# Patient Record
Sex: MALE | Race: WHITE
[De-identification: names, ages, dates, MRNs, and addresses within clinical notes are randomized per-mention and may not be internally consistent; named-entity substitution may affect disease eponyms.]

---

## 2018-02-19 ENCOUNTER — HOSPITAL ENCOUNTER (EMERGENCY)
Dept: HOSPITAL 61 - ER | Age: 23
Discharge: HOME | End: 2018-02-19
Payer: SELF-PAY

## 2018-02-19 DIAGNOSIS — H66.92: ICD-10-CM

## 2018-02-19 DIAGNOSIS — I10: ICD-10-CM

## 2018-02-19 DIAGNOSIS — Z88.0: ICD-10-CM

## 2018-02-19 DIAGNOSIS — K02.9: Primary | ICD-10-CM

## 2018-02-19 DIAGNOSIS — J45.909: ICD-10-CM

## 2018-02-19 PROCEDURE — 99284 EMERGENCY DEPT VISIT MOD MDM: CPT

## 2018-09-25 ENCOUNTER — HOSPITAL ENCOUNTER (EMERGENCY)
Dept: HOSPITAL 61 - ER | Age: 23
Discharge: HOME | End: 2018-09-25
Payer: SELF-PAY

## 2018-09-25 VITALS — DIASTOLIC BLOOD PRESSURE: 88 MMHG | SYSTOLIC BLOOD PRESSURE: 170 MMHG

## 2018-09-25 VITALS — BODY MASS INDEX: 47.74 KG/M2 | HEIGHT: 68 IN | WEIGHT: 315 LBS

## 2018-09-25 DIAGNOSIS — H92.02: Primary | ICD-10-CM

## 2018-09-25 DIAGNOSIS — I10: ICD-10-CM

## 2018-09-25 DIAGNOSIS — Z90.89: ICD-10-CM

## 2018-09-25 DIAGNOSIS — K02.9: ICD-10-CM

## 2018-09-25 DIAGNOSIS — J45.909: ICD-10-CM

## 2018-09-25 DIAGNOSIS — Z88.0: ICD-10-CM

## 2018-09-25 PROCEDURE — 99283 EMERGENCY DEPT VISIT LOW MDM: CPT

## 2018-09-25 RX ADMIN — IBUPROFEN ONE MG: 800 TABLET ORAL at 07:30

## 2018-09-25 RX ADMIN — CLINDAMYCIN HYDROCHLORIDE ONE MG: 150 CAPSULE ORAL at 07:48

## 2018-09-25 NOTE — PHYS DOC
Past Medical History


Past Medical History:  Asthma, Hypertension


Past Surgical History:  Tonsillectomy, Other


Additional Past Surgical Histo:  wisdom teeth


Alcohol Use:  None


Drug Use:  None





Adult General


Chief Complaint


Chief Complaint:  EARACHE/EAR PAIN





HPI


HPI





Patient is a 23  year old male who presents with left ear pain.  She complains 

of pain in the left ear which started over the last 24 hours. He's had no fever 

or chills. He does relate a prior history of multiple ear infections since he 

was a child. No neck pain or stiffness. No headaches. Patient is known to have 

multiple dental caries on the left side and does have scheduled appointment 

with a dentist later today.





Review of Systems


Review of Systems





Constitutional: Denies fever or chills 


Eyes: Denies change in visual acuity


HENT: Denies nasal congestion or sore throat 


Respiratory: Denies cough or shortness of breath 


Cardiovascular: No additional information not addressed in HPI 


Musculoskeletal: Denies back pain or joint pain 


Integument: Denies rash or skin lesions 


Neurologic: Denies headache





All other systems were reviewed and found to be within normal limits, except as 

documented in this note.





Allergies


Allergies





Allergies








Coded Allergies Type Severity Reaction Last Updated Verified


 


  Penicillins Allergy Intermediate  8/22/14 No











Physical Exam


Physical Exam





Constitutional: Well developed, well nourished, no acute distress, non-toxic 

appearance


HENT: Normocephalic, atraumatic, bilateral external ears normal, oropharynx 

moist, no oral exudates, nose normal, TM on left is clear, grey, with + light 

reflex, multiple dental caries and fractures are present over the left upper 

and lower molar area.  No abscess or fluctuance is seen.  Floor of mouth is 

soft.  Posterior oral pharynx is clear


Eyes: PERRLA, EOMI, conjunctiva normal, no discharge 


Neck: Normal range of motion, no tenderness, supple, no stridor 


Cardiovascular:Heart rate regular rhythm, no murmur 


Lungs & Thorax:  Bilateral breath sounds clear to auscultation  


Skin: Warm, dry  


Extremities: No tenderness 


Neurologic: Alert and oriented X 3


Psychologic: Affect normal





EKG


EKG


[]





Radiology/Procedures


Radiology/Procedures


[]





Course & Med Decision Making


Course & Med Decision Making


Pertinent Labs and Imaging studies reviewed. (See chart for details)





Patient is seen for left ear pain.  No acute ear infection is present on exam 

but the patient describes pain along the SCM muscle and left jaw.  Poor 

dentition.  Source of the patient's discomfort seems most likely from a dental 

source.  He is given ibuprofen and clindamycin in the ER (PCN allergic).  

Discharged home with Rx for the same.  Advised to keep his already scheduled 

appointment later today.  F/u with PCP also or return to the ER for any new or 

worsening symptoms.





Dragon Disclaimer


Dragon Disclaimer


This electronic medical record was generated, in whole or in part, using a 

voice recognition dictation system.





Departure


Departure


Referrals:  


NO PCP (PCP)


Scripts


Clindamycin Hcl (CLINDAMYCIN HCL) 300 Mg Capsule


300 MG PO TID for 7 Days, #21 CAP


   Prov: FREDA GARSIA DO         9/25/18 


Ibuprofen (IBUPROFEN) 800 Mg Tablet


800 MG PO PRN TID PRN for PAIN, #21 TAB


   take with food or milk to avoid upsetting stomach


   Prov: FREDA GARSIA DO         9/25/18











FREDA GARSIA DO Sep 25, 2018 06:54

## 2021-03-12 ENCOUNTER — HOSPITAL ENCOUNTER (EMERGENCY)
Dept: HOSPITAL 61 - ER | Age: 26
Discharge: HOME | End: 2021-03-12
Payer: SELF-PAY

## 2021-03-12 VITALS — BODY MASS INDEX: 45.1 KG/M2 | WEIGHT: 315 LBS | HEIGHT: 70 IN

## 2021-03-12 VITALS — SYSTOLIC BLOOD PRESSURE: 153 MMHG | DIASTOLIC BLOOD PRESSURE: 70 MMHG

## 2021-03-12 DIAGNOSIS — Z88.0: ICD-10-CM

## 2021-03-12 DIAGNOSIS — L50.9: Primary | ICD-10-CM

## 2021-03-12 DIAGNOSIS — J45.909: ICD-10-CM

## 2021-03-12 DIAGNOSIS — E11.9: ICD-10-CM

## 2021-03-12 DIAGNOSIS — L03.818: ICD-10-CM

## 2021-03-12 DIAGNOSIS — I10: ICD-10-CM

## 2021-03-12 LAB
ALBUMIN SERPL-MCNC: 3.1 G/DL (ref 3.4–5)
ALBUMIN/GLOB SERPL: 0.9 {RATIO} (ref 1–1.7)
ALP SERPL-CCNC: 69 U/L (ref 46–116)
ALT SERPL-CCNC: 55 U/L (ref 16–63)
AMPHETAMINE/METHAMPHETAMINE: (no result)
ANION GAP SERPL CALC-SCNC: 10 MMOL/L (ref 6–14)
APTT PPP: (no result) S
AST SERPL-CCNC: 38 U/L (ref 15–37)
BACTERIA #/AREA URNS HPF: 0 /HPF
BARBITURATES UR-MCNC: (no result) UG/ML
BASE EXCESS STD BLDV CALC-SCNC: -1 MMOL/L (ref 0–3)
BASOPHILS # BLD AUTO: 0 X10^3/UL (ref 0–0.2)
BASOPHILS NFR BLD: 1 % (ref 0–3)
BENZODIAZ UR-MCNC: (no result) UG/L
BILIRUB SERPL-MCNC: 1.2 MG/DL (ref 0.2–1)
BILIRUB UR QL STRIP: (no result)
BUN SERPL-MCNC: 10 MG/DL (ref 8–26)
BUN/CREAT SERPL: 10 (ref 6–20)
CALCIUM SERPL-MCNC: 8.3 MG/DL (ref 8.5–10.1)
CANNABINOIDS UR-MCNC: (no result) UG/L
CHLORIDE SERPL-SCNC: 102 MMOL/L (ref 98–107)
CO2 BLDV-SCNC: 26 MMOL/L (ref 21–32)
CO2 SERPL-SCNC: 25 MMOL/L (ref 21–32)
COCAINE UR-MCNC: (no result) NG/ML
CREAT SERPL-MCNC: 1 MG/DL (ref 0.7–1.3)
EOSINOPHIL NFR BLD: 0.6 X10^3/UL (ref 0–0.7)
EOSINOPHIL NFR BLD: 8 % (ref 0–3)
ERYTHROCYTE [DISTWIDTH] IN BLOOD BY AUTOMATED COUNT: 14.5 % (ref 11.5–14.5)
FIBRINOGEN PPP-MCNC: CLEAR MG/DL
GFR SERPLBLD BASED ON 1.73 SQ M-ARVRAT: 89.6 ML/MIN
GLUCOSE SERPL-MCNC: 121 MG/DL (ref 70–99)
GRAN CASTS #/AREA URNS LPF: (no result) /HPF
HCO3 BLDV-SCNC: 24 MMOL/L (ref 24–28)
HCT VFR BLD CALC: 45.8 % (ref 39–53)
HGB BLD-MCNC: 15.5 G/DL (ref 13–17.5)
HYALINE CASTS #/AREA URNS LPF: (no result) /HPF
INSPIRATION SETTING TIME VENT: 21
LYMPHOCYTES # BLD: 0.8 X10^3/UL (ref 1–4.8)
LYMPHOCYTES NFR BLD AUTO: 9 % (ref 24–48)
MCH RBC QN AUTO: 29 PG (ref 25–35)
MCHC RBC AUTO-ENTMCNC: 34 G/DL (ref 31–37)
MCV RBC AUTO: 85 FL (ref 79–100)
METHADONE SERPL-MCNC: (no result) NG/ML
MONO #: 0.6 X10^3/UL (ref 0–1.1)
MONOCYTES NFR BLD: 7 % (ref 0–9)
NEUT #: 6.3 X10^3/UL (ref 1.8–7.7)
NEUTROPHILS NFR BLD AUTO: 75 % (ref 31–73)
NITRITE UR QL STRIP: NEGATIVE
OPIATES UR-MCNC: (no result) NG/ML
PCO2 BLDV: 44 MMHG (ref 41–51)
PCP SERPL-MCNC: (no result) MG/DL
PH BLDV: 7.35 [PH] (ref 7.32–7.42)
PH UR STRIP: 5.5 [PH]
PLATELET # BLD AUTO: 240 X10^3/UL (ref 140–400)
PO2 BLDV: 64 MMHG (ref 20–40)
POTASSIUM SERPL-SCNC: 4 MMOL/L (ref 3.5–5.1)
PROT SERPL-MCNC: 6.7 G/DL (ref 6.4–8.2)
PROT UR STRIP-MCNC: 30 MG/DL
RBC # BLD AUTO: 5.37 X10^6/UL (ref 4.3–5.7)
RBC #/AREA URNS HPF: 0 /HPF (ref 0–2)
SAO2 % BLDV FROM PO2: 91 %
SODIUM SERPL-SCNC: 137 MMOL/L (ref 136–145)
UROBILINOGEN UR-MCNC: 0.2 MG/DL
WBC # BLD AUTO: 8.3 X10^3/UL (ref 4–11)
WBC #/AREA URNS HPF: (no result) /HPF (ref 0–4)

## 2021-03-12 PROCEDURE — 96361 HYDRATE IV INFUSION ADD-ON: CPT

## 2021-03-12 PROCEDURE — 87086 URINE CULTURE/COLONY COUNT: CPT

## 2021-03-12 PROCEDURE — 80053 COMPREHEN METABOLIC PANEL: CPT

## 2021-03-12 PROCEDURE — 87040 BLOOD CULTURE FOR BACTERIA: CPT

## 2021-03-12 PROCEDURE — 96374 THER/PROPH/DIAG INJ IV PUSH: CPT

## 2021-03-12 PROCEDURE — 83880 ASSAY OF NATRIURETIC PEPTIDE: CPT

## 2021-03-12 PROCEDURE — 80307 DRUG TEST PRSMV CHEM ANLYZR: CPT

## 2021-03-12 PROCEDURE — 83605 ASSAY OF LACTIC ACID: CPT

## 2021-03-12 PROCEDURE — 84484 ASSAY OF TROPONIN QUANT: CPT

## 2021-03-12 PROCEDURE — 96375 TX/PRO/DX INJ NEW DRUG ADDON: CPT

## 2021-03-12 PROCEDURE — 81001 URINALYSIS AUTO W/SCOPE: CPT

## 2021-03-12 PROCEDURE — 82803 BLOOD GASES ANY COMBINATION: CPT

## 2021-03-12 PROCEDURE — 36415 COLL VENOUS BLD VENIPUNCTURE: CPT

## 2021-03-12 PROCEDURE — 93005 ELECTROCARDIOGRAM TRACING: CPT

## 2021-03-12 PROCEDURE — 99285 EMERGENCY DEPT VISIT HI MDM: CPT

## 2021-03-12 PROCEDURE — 85025 COMPLETE CBC W/AUTO DIFF WBC: CPT

## 2021-03-12 RX ADMIN — CLINDAMYCIN HYDROCHLORIDE ONE MG: 150 CAPSULE ORAL at 05:33

## 2021-03-12 RX ADMIN — BACITRACIN ONE MLS/HR: 5000 INJECTION, POWDER, FOR SOLUTION INTRAMUSCULAR at 04:06

## 2021-03-12 RX ADMIN — METHYLPREDNISOLONE SODIUM SUCCINATE ONE MG: 125 INJECTION, POWDER, FOR SOLUTION INTRAMUSCULAR; INTRAVENOUS at 05:33

## 2021-03-12 NOTE — ED.ADGEN
Past Medical History


Past Medical History:  Asthma, Diabetes-Type II, Hypertension


Past Surgical History:  Tonsillectomy, Other


Additional Past Surgical Histo:  wisdom teeth


Smoking Status:  Never Smoker


Alcohol Use:  Rarely


Drug Use:  None





General Adult


EDM:


Chief Complaint:  SKIN RASH/ABSCESS





HPI:


HPI:


25-year-old male coming in for diffuse upper body skin rash.  Patient states the

rash started off on his right arm and then his left arm.  None stated it went 

across his trunk.  Says the rash is itchy and he has been scratching at it.  Has

been using over-the-counter creams without improvement.  Denies any fevers, has 

had a nonproductive cough.  No vomiting or diarrhea.  Patient states he has a 

history of asthma and possibly diabetes.  Denies any history of IV drug use.  No

denies any sick contacts.





Review of Systems:


Review of Systems:


All other systems within normal limits except for as noted in the HPI





Current Medications:





Current Medications








 Medications


  (Trade)  Dose


 Ordered  Sig/Laisha  Start Time


 Stop Time Status Last Admin


Dose Admin


 


 Diphenhydramine


 HCl


  (Benadryl)  50 mg  1X  ONCE  3/12/21 04:00


 3/12/21 04:01 DC 3/12/21 04:09


50 MG


 


 Sodium Chloride  500 ml @ 


 500 mls/hr  1X  ONCE  3/12/21 04:00


 3/12/21 04:59 DC 3/12/21 04:06


500 MLS/HR











Allergies:


Allergies:





Allergies








Coded Allergies Type Severity Reaction Last Updated Verified


 


  Penicillins Allergy Intermediate  8/22/14 No











Physical Exam:


PE:


Constitutional: Well developed, well nourished, obese, ill-appearing []


HENT: Normocephalic, atraumatic, bilateral external ears normal,  nose normal. 

[]


Eyes: PERRLA, conjunctiva normal, no discharge. [] 


Neck: No rigidity, supple, no stridor. [] 


Cardiovascular: Tachycardic, regular rhythm, brisk cap refill []


Lungs & Thorax: Symmetric chest expansion, tachypneic []


Abdomen: Soft, nondistended.


Skin: Warm, diffuse macular rash with excoriated areas] 


Back: Unremarkable


Extremities: No deformities, range of motion grossly intact, no lower extremity 

edema [] 


Neurologic: Alert and oriented X 3, no focal deficits noted. []


Psychologic: Affect normal, judgement normal, mood normal. []





Current Patient Data:


Labs:





                                Laboratory Tests








Test


 3/12/21


04:00 3/12/21


04:24


 


White Blood Count


 8.3 x10^3/uL


(4.0-11.0) 





 


Red Blood Count


 5.37 x10^6/uL


(4.30-5.70) 





 


Hemoglobin


 15.5 g/dL


(13.0-17.5) 





 


Hematocrit


 45.8 %


(39.0-53.0) 





 


Mean Corpuscular Volume


 85 fL ()


 





 


Mean Corpuscular Hemoglobin 29 pg (25-35)   


 


Mean Corpuscular Hemoglobin


Concent 34 g/dL


(31-37) 





 


Red Cell Distribution Width


 14.5 %


(11.5-14.5) 





 


Platelet Count


 240 x10^3/uL


(140-400) 





 


Neutrophils (%) (Auto) 75 % (31-73)  H 


 


Lymphocytes (%) (Auto) 9 % (24-48)  L 


 


Monocytes (%) (Auto) 7 % (0-9)   


 


Eosinophils (%) (Auto) 8 % (0-3)  H 


 


Basophils (%) (Auto) 1 % (0-3)   


 


Neutrophils # (Auto)


 6.3 x10^3/uL


(1.8-7.7) 





 


Lymphocytes # (Auto)


 0.8 x10^3/uL


(1.0-4.8)  L 





 


Monocytes # (Auto)


 0.6 x10^3/uL


(0.0-1.1) 





 


Eosinophils # (Auto)


 0.6 x10^3/uL


(0.0-0.7) 





 


Basophils # (Auto)


 0.0 x10^3/uL


(0.0-0.2) 





 


Sodium Level


 137 mmol/L


(136-145) 





 


Potassium Level


 4.0 mmol/L


(3.5-5.1) 





 


Chloride Level


 102 mmol/L


() 





 


Carbon Dioxide Level


 25 mmol/L


(21-32) 





 


Anion Gap 10 (6-14)   


 


Blood Urea Nitrogen


 10 mg/dL


(8-26) 





 


Creatinine


 1.0 mg/dL


(0.7-1.3) 





 


Estimated GFR


(Cockcroft-Gault) 89.6  


 





 


BUN/Creatinine Ratio 10 (6-20)   


 


Glucose Level


 121 mg/dL


(70-99)  H 





 


Lactic Acid Level


 1.5 mmol/L


(0.4-2.0) 





 


Calcium Level


 8.3 mg/dL


(8.5-10.1)  L 





 


Total Bilirubin


 1.2 mg/dL


(0.2-1.0)  H 





 


Aspartate Amino Transferase


(AST) 38 U/L (15-37)


H 





 


Alanine Aminotransferase (ALT)


 55 U/L (16-63)


 





 


Alkaline Phosphatase


 69 U/L


() 





 


Troponin I Quantitative


 < 0.017 ng/mL


(0.000-0.055) 





 


NT-Pro-B-Type Natriuretic


Peptide 144 pg/mL


(0-124)  H 





 


Total Protein


 6.7 g/dL


(6.4-8.2) 





 


Albumin


 3.1 g/dL


(3.4-5.0)  L 





 


Albumin/Globulin Ratio


 0.9 (1.0-1.7)


L 





 


POC Venous pH


 


 7.35


(7.32-7.42)


 


POC Venous pCO2


 


 44 mmHg


(41-51)


 


POC Venous pO2


 


 64 mmHg


(20-40)  H


 


Venous Blood HCO3


 


 24 mmol/L


(24-28)


 


POC Venous O2 Saturation


(Anila) 


 91 %  





 


POC FiO2  21.0  





                                Laboratory Tests


3/12/21 04:00








                                Laboratory Tests


3/12/21 04:00








Vital Signs:





                                   Vital Signs








  Date Time  Temp Pulse Resp B/P (MAP) Pulse Ox O2 Delivery O2 Flow Rate FiO2


 


3/12/21 04:42  106  143/86 (105) 100 Room Air  


 


3/12/21 03:00 98.7  20     





 98.7       











EKG:


EKG:


Sinus tachycardia, heart enters the beats per minute, normal axis, no ST 

elevation or depression, no ectopy.  Normal intervals.  []





Heart Score:


C/O Chest Pain:  N/A


Risk Factors:


Risk Factors:  DM, Current or recent (<one month) smoker, HTN, HLP, family 

history of CAD, obesity.


Risk Scores:


Score 0 - 3:  2.5% MACE over next 6 weeks - Discharge Home


Score 4 - 6:  20.3% MACE over next 6 weeks - Admit for Clinical Observation


Score 7 - 10:  72.7% MACE over next 6 weeks - Early Invasive Strategies





Radiology/Procedures:


Radiology/Procedures:


[]





Course & Med Decision Making:


Course & Med Decision Making


Symptoms improved and labs unremarkable.  Patient feeling better after IV 

Benadryl.  Will give Solu-Medrol and first dose of clindamycin here.  Patient 

has a reaction of hives to penicillins.  Discussed return precautions if not 

getting better.





Dragon Disclaimer:


Dragon Disclaimer:


This electronic medical record was generated, in whole or in part, using a voice

 recognition dictation system.





Departure


Departure


Impression:  


   Primary Impression:  


   Hives


   Additional Impression:  


   Cellulitis


Disposition:  01 DC HOME SELF CARE/HOMELESS


Condition:  STABLE


Referrals:  


NO PCP (PCP)


Patient Instructions:  Hives





Additional Instructions:  


Follow-up with a primary care provider from the packet provided.  Return to 

emergency department if symptoms worsen, difficulty breathing or throat 

swelling, fevers over 100.


Scripts


Hydrocodone/Acetaminophen (Hydrocodone-Acetamin 5-325 mg) 1 Each Tablet


1 EACH PO PRN Q6-8HRS PRN for PAIN for 3 Days, #10 TAB


   Prov: FARZANA WU MD         3/12/21 


Hydroxyzine Hcl (HYDROXYZINE HCL) 50 Mg Tablet


50 MG PO TID PRN for ITCHING for 10 Days, #30 TAB


   Prov: FARZANA WU MD         3/12/21 


Clindamycin Hcl (CLINDAMYCIN HCL) 150 Mg Capsule


3 CAP PO TID for antibiotic for 7 Days, #63 CAP


   Prov: FARZANA WU MD         3/12/21





Problem Qualifiers











FARZANA WU MD              Mar 12, 2021 03:45

## 2021-03-12 NOTE — EKG
[facility]

              [address] [place] [address]

Test Date:    2021               Test Time:    03:42:29

Pat Name:     DENA RENE            Department:   

Patient ID:   [id number]           Room:          

Gender:       M                        Technician:   

:          [id number]               Requested By: FARZANA WU

Order Number: [id number]           Reading MD:     

                                 Measurements

Intervals                              Axis          

Rate:         107                      P:            29

PA:           162                      QRS:          46

QRSD:         82                       T:            14

QT:           310                                    

QTc:          419                                    

                           Interpretive Statements

SINUS TACHYCARDIA

OTHERWISE NORMAL ECG

RI6.02

No previous ECG available for comparison

## 2021-03-18 ENCOUNTER — HOSPITAL ENCOUNTER (INPATIENT)
Dept: HOSPITAL 61 - ER | Age: 26
LOS: 5 days | Discharge: HOME | DRG: 603 | End: 2021-03-23
Attending: INTERNAL MEDICINE | Admitting: INTERNAL MEDICINE
Payer: SELF-PAY

## 2021-03-18 VITALS — BODY MASS INDEX: 45.1 KG/M2 | WEIGHT: 315 LBS | HEIGHT: 70 IN

## 2021-03-18 DIAGNOSIS — Z83.3: ICD-10-CM

## 2021-03-18 DIAGNOSIS — S80.862A: ICD-10-CM

## 2021-03-18 DIAGNOSIS — J45.909: ICD-10-CM

## 2021-03-18 DIAGNOSIS — E66.01: ICD-10-CM
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
## 2021-03-18 DIAGNOSIS — Z90.49: ICD-10-CM

## 2021-03-18 DIAGNOSIS — E11.9: ICD-10-CM

## 2021-03-18 DIAGNOSIS — L03.113: ICD-10-CM

## 2021-03-18 DIAGNOSIS — L03.116: ICD-10-CM

## 2021-03-18 DIAGNOSIS — D72.821: ICD-10-CM

## 2021-03-18 DIAGNOSIS — L03.115: ICD-10-CM

## 2021-03-18 DIAGNOSIS — L03.114: Primary | ICD-10-CM

## 2021-03-18 DIAGNOSIS — Z82.49: ICD-10-CM

## 2021-03-18 DIAGNOSIS — L23.89: ICD-10-CM
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
## 2021-03-18 DIAGNOSIS — B88.8: ICD-10-CM

## 2021-03-18 DIAGNOSIS — L26: ICD-10-CM
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
## 2021-03-18 DIAGNOSIS — S40.862A: ICD-10-CM

## 2021-03-18 DIAGNOSIS — S80.861A: ICD-10-CM

## 2021-03-18 DIAGNOSIS — Z88.0: ICD-10-CM

## 2021-03-18 DIAGNOSIS — I10: ICD-10-CM

## 2021-03-18 DIAGNOSIS — F17.210: ICD-10-CM
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
## 2021-03-18 DIAGNOSIS — S40.861A: ICD-10-CM

## 2021-03-18 PROCEDURE — 80053 COMPREHEN METABOLIC PANEL: CPT

## 2021-03-18 PROCEDURE — 85007 BL SMEAR W/DIFF WBC COUNT: CPT

## 2021-03-18 PROCEDURE — G0378 HOSPITAL OBSERVATION PER HR: HCPCS

## 2021-03-18 PROCEDURE — G0379 DIRECT REFER HOSPITAL OBSERV: HCPCS

## 2021-03-18 PROCEDURE — 86592 SYPHILIS TEST NON-TREP QUAL: CPT

## 2021-03-18 PROCEDURE — 99285 EMERGENCY DEPT VISIT HI MDM: CPT

## 2021-03-18 PROCEDURE — 74177 CT ABD & PELVIS W/CONTRAST: CPT

## 2021-03-18 PROCEDURE — 86705 HEP B CORE ANTIBODY IGM: CPT

## 2021-03-18 PROCEDURE — 86709 HEPATITIS A IGM ANTIBODY: CPT

## 2021-03-18 PROCEDURE — 71260 CT THORAX DX C+: CPT

## 2021-03-18 PROCEDURE — 85651 RBC SED RATE NONAUTOMATED: CPT

## 2021-03-18 PROCEDURE — 82962 GLUCOSE BLOOD TEST: CPT

## 2021-03-18 PROCEDURE — 85025 COMPLETE CBC W/AUTO DIFF WBC: CPT

## 2021-03-18 PROCEDURE — 86703 HIV-1/HIV-2 1 RESULT ANTBDY: CPT

## 2021-03-18 PROCEDURE — 86038 ANTINUCLEAR ANTIBODIES: CPT

## 2021-03-18 PROCEDURE — 36415 COLL VENOUS BLD VENIPUNCTURE: CPT

## 2021-03-18 PROCEDURE — 84443 ASSAY THYROID STIM HORMONE: CPT

## 2021-03-18 PROCEDURE — 86140 C-REACTIVE PROTEIN: CPT

## 2021-03-18 PROCEDURE — 86431 RHEUMATOID FACTOR QUANT: CPT

## 2021-03-18 PROCEDURE — 87340 HEPATITIS B SURFACE AG IA: CPT

## 2021-03-18 PROCEDURE — 82607 VITAMIN B-12: CPT

## 2021-03-18 PROCEDURE — 86803 HEPATITIS C AB TEST: CPT

## 2021-03-18 PROCEDURE — 96365 THER/PROPH/DIAG IV INF INIT: CPT

## 2021-03-19 VITALS — DIASTOLIC BLOOD PRESSURE: 94 MMHG | SYSTOLIC BLOOD PRESSURE: 159 MMHG

## 2021-03-19 VITALS — SYSTOLIC BLOOD PRESSURE: 153 MMHG | DIASTOLIC BLOOD PRESSURE: 83 MMHG

## 2021-03-19 VITALS — DIASTOLIC BLOOD PRESSURE: 70 MMHG | SYSTOLIC BLOOD PRESSURE: 117 MMHG

## 2021-03-19 VITALS — DIASTOLIC BLOOD PRESSURE: 95 MMHG | SYSTOLIC BLOOD PRESSURE: 153 MMHG

## 2021-03-19 VITALS — SYSTOLIC BLOOD PRESSURE: 175 MMHG | DIASTOLIC BLOOD PRESSURE: 92 MMHG

## 2021-03-19 VITALS — SYSTOLIC BLOOD PRESSURE: 146 MMHG | DIASTOLIC BLOOD PRESSURE: 110 MMHG

## 2021-03-19 LAB
% BANDS: 2 % (ref 0–9)
% LYMPHS: 20 % (ref 24–48)
% MONOS: 10 % (ref 0–10)
% SEGS: 50 % (ref 35–66)
ALBUMIN SERPL-MCNC: 3.1 G/DL (ref 3.4–5)
ALBUMIN/GLOB SERPL: 0.9 {RATIO} (ref 1–1.7)
ALP SERPL-CCNC: 63 U/L (ref 46–116)
ALT SERPL-CCNC: 59 U/L (ref 16–63)
ANION GAP SERPL CALC-SCNC: 10 MMOL/L (ref 6–14)
AST SERPL-CCNC: 39 U/L (ref 15–37)
BASOPHILS # BLD AUTO: 0.1 X10^3/UL (ref 0–0.2)
BASOPHILS NFR BLD: 1 % (ref 0–3)
BILIRUB SERPL-MCNC: 1.3 MG/DL (ref 0.2–1)
BUN SERPL-MCNC: 6 MG/DL (ref 8–26)
BUN/CREAT SERPL: 7 (ref 6–20)
CALCIUM SERPL-MCNC: 8 MG/DL (ref 8.5–10.1)
CHLORIDE SERPL-SCNC: 103 MMOL/L (ref 98–107)
CO2 SERPL-SCNC: 23 MMOL/L (ref 21–32)
CREAT SERPL-MCNC: 0.9 MG/DL (ref 0.7–1.3)
EOSINOPHIL NFR BLD AUTO: 18 % (ref 0–5)
EOSINOPHIL NFR BLD: 1.2 X10^3/UL (ref 0–0.7)
EOSINOPHIL NFR BLD: 21 % (ref 0–3)
ERYTHROCYTE [DISTWIDTH] IN BLOOD BY AUTOMATED COUNT: 14.2 % (ref 11.5–14.5)
GFR SERPLBLD BASED ON 1.73 SQ M-ARVRAT: 101.2 ML/MIN
GLUCOSE SERPL-MCNC: 94 MG/DL (ref 70–99)
HCT VFR BLD CALC: 41.7 % (ref 39–53)
HGB BLD-MCNC: 14.4 G/DL (ref 13–17.5)
LYMPHOCYTES # BLD: 1.2 X10^3/UL (ref 1–4.8)
LYMPHOCYTES NFR BLD AUTO: 21 % (ref 24–48)
MCH RBC QN AUTO: 29 PG (ref 25–35)
MCHC RBC AUTO-ENTMCNC: 35 G/DL (ref 31–37)
MCV RBC AUTO: 84 FL (ref 79–100)
MONO #: 0.5 X10^3/UL (ref 0–1.1)
MONOCYTES NFR BLD: 10 % (ref 0–9)
NEUT #: 2.6 X10^3/UL (ref 1.8–7.7)
NEUTROPHILS NFR BLD AUTO: 47 % (ref 31–73)
PLATELET # BLD AUTO: 226 X10^3/UL (ref 140–400)
PLATELET # BLD EST: ADEQUATE 10*3/UL
POTASSIUM SERPL-SCNC: 4.1 MMOL/L (ref 3.5–5.1)
PROT SERPL-MCNC: 6.5 G/DL (ref 6.4–8.2)
RBC # BLD AUTO: 4.99 X10^6/UL (ref 4.3–5.7)
SODIUM SERPL-SCNC: 136 MMOL/L (ref 136–145)
WBC # BLD AUTO: 5.5 X10^3/UL (ref 4–11)
WBC TOXIC VACUOLES BLD QL SMEAR: SLIGHT

## 2021-03-19 RX ADMIN — FAMOTIDINE SCH MG: 20 TABLET ORAL at 20:15

## 2021-03-19 RX ADMIN — FAMOTIDINE SCH MG: 20 TABLET ORAL at 09:10

## 2021-03-19 RX ADMIN — DOXYCYCLINE HYCLATE SCH MG: 100 TABLET, COATED ORAL at 22:39

## 2021-03-19 RX ADMIN — MONTELUKAST SODIUM SCH MG: 10 TABLET, FILM COATED ORAL at 20:15

## 2021-03-19 NOTE — PHYS DOC
Past Medical History


Past Medical History:  Asthma, Diabetes-Type II, Hypertension


Past Surgical History:  Tonsillectomy, Other


Additional Past Surgical Histo:  wisdom teeth


Smoking Status:  Never Smoker


Alcohol Use:  Rarely


Drug Use:  None





General Adult


EDM:


Chief Complaint:  SKIN PROBLEM





HPI:


HPI:





Patient is a 25  year old male presents with a chief complaint of rash.  Rash is

diffuse in nature includes bilateral extremities torso and abdomen.  Patient 

states rash is been ongoing for 1 month progressively becoming worse.  Patient 

was seen in the emergency department 1 week ago placed on antibiotics.





Review of Systems:


Constitutional symptoms-  No fever, no chills.


Eyes- No Discharge, No Visual Loss


Respiratory symptoms-  No shortness of breath, No wheezing, No Dyspnea on 

Exertion


Cardiovascular Systems; No chest pain, No Palpitations, No syncope


Gastrointestinal symptoms:  NO abdominal pain, no nausea, no vomiting or 

diarrhea.


Genitourinary symptoms:  No dysuria.  


Musculoskeletal symptoms:  No back pain  No extremity pain.


NEUROLOGICAL Symptoms:  No headache, no generalized weakness; No focal Weakness


Skin positive rash positive itch





Heart Score:


C/O Chest Pain:  No


Risk Factors:


Risk Factors:  DM, Current or recent (<one month) smoker, HTN, HLP, family 

history of CAD, obesity.


Risk Scores:


Score 0 - 3:  2.5% MACE over next 6 weeks - Discharge Home


Score 4 - 6:  20.3% MACE over next 6 weeks - Admit for Clinical Observation


Score 7 - 10:  72.7% MACE over next 6 weeks - Early Invasive Strategies





Allergies:


Allergies:





Allergies








Coded Allergies Type Severity Reaction Last Updated Verified


 


  Penicillins Allergy Intermediate  8/22/14 No











Physical Exam:


PE:





Constitutional: Well developed, well nourished, no acute distress, non-toxic 

appearance. []


HENT: Normocephalic, atraumatic, bilateral external ears normal, oropharynx 

moist, no oral exudates, nose normal. []


Eyes: PERRLA, EOMI, conjunctiva normal, no discharge. [] 


Neck: Normal range of motion, no tenderness, supple, no stridor. [] 


Cardiovascular:Heart rate regular rhythm, no murmur []


Lungs & Thorax:  Bilateral breath sounds clear to auscultation []


Abdomen: Bowel sounds normal, soft, no tenderness, no masses, no pulsatile 

masses. [] 


Skin: Diffuse rash extremities TORSO abdomen excoriated regions


Back: No tenderness, no CVA tenderness. [] 


Extremities: No tenderness, no cyanosis, no clubbing, ROM intact, no edema. [] 


Neurologic: Alert and oriented X 3, normal motor function, normal sensory 

function, no focal deficits noted. []


Psychologic: Affect normal, judgement normal, mood normal. []





Current Patient Data:


Vital Signs:





                                   Vital Signs








  Date Time  Temp Pulse Resp B/P (MAP) Pulse Ox O2 Delivery O2 Flow Rate FiO2


 


3/18/21 23:35 97.6 96 20 138/73 (94) 99 Room Air  





 97.6       











EKG:


EKG:


[]





Radiology/Procedures:


Radiology/Procedures:


[]





Course & Med Decision Making:


Course & Med Decision Making


Pertinent Labs and Imaging studies reviewed. (See chart for details)





[] Patient admitted to the hospital treated with vancomycin and admitted for 

further evaluation and treatment.





Dragon Disclaimer:


Dragon Disclaimer:


This electronic medical record was generated, in whole or in part, using a voice

 recognition dictation system.





Departure


Departure


Impression:  


   Primary Impression:  


   Rash and nonspecific skin eruption


   Additional Impression:  


   Cellulitis


Disposition:  09 ADMITTED AS INPT THIS HOSP


Condition:  STABLE


Referrals:  


NO PCP (PCP)











STANFORD CLARK DO            Mar 19, 2021 01:08

## 2021-03-19 NOTE — NUR
SS following for discharge planning. SS reviewed pt chart and discussed with pt RN. Pt is 
from home and is currently on room air. Self pay. Pt on PO Prednisone and Benadryl. 
Discharge plan is to home when medically ready. SS will continue to follow for discharge 
planning.

## 2021-03-19 NOTE — PDOC1
History and Physical


Date of Admission


Date of Admission


DATE: 3/19/21 


TIME: 07:36





Identification/Chief Complaint


Chief Complaint


Rash





Source


Source:  Patient





History of Present Illness


History of Present Illness


Mr Combs is a 25  year old male w/ PMHx Asthma, Diabetes-Type II, 

Hypertension presents with a chief complaint of rash.  Rash is diffuse in nature

includes bilateral extremities torso and abdomen.  Patient states rash is been 

ongoing for 1 month progressively becoming worse.  Patient was seen in the 

emergency department 1 week ago placed on antibiotics. Rash is intensely 

pruritic, covering more than 60% of his body with crusting in multiple 

locations.


He notes he has "ticks" in his bed. Notably the pictures of the insects he naa

cribe are actually bedbugs. I have educated him on this.


Eosinophils 1.2x10^3, otherwise labs WNL.





Admitted for further care.





Past Medical History


Cardiovascular:  HTN


Pulmonary:  Asthma


Endocrine:  Diabetes





Family History


Family History:  Diabetes, Hypertension





Social History


Smoke:  <1 pack per day


ALCOHOL:  none


Drugs:  None





Current Problem List


Problem List


Problems


Medical Problems:


(1) Cellulitis


Status: Acute  





(2) Rash and nonspecific skin eruption


Status: Acute  











Current Medications


Current Medications





Current Medications


Vancomycin HCl 1.25 gm/Sodium Chloride 250 ml @  166.667 mls/hr 1X  ONCE IV  

Last administered on 3/19/21at 03:02;  Start 3/19/21 at 02:00;  Stop 3/19/21 at 

03:29;  Status DC





Active Scripts


Active





Allergies


Allergies:  


Coded Allergies:  


     Penicillins (Unverified  Allergy, Intermediate, 8/22/14)





ROS


General:  YES: Fatigue, Malaise; 


   No: Chills, Night Sweats, Appetite, Other


PSYCHOLOGICAL ROS:  No: Anxiety, Behavioral Disorder, Concentration difficultie,

Decreased libido, Depression, Disorientation, Hallucinations, Hostility, 

Irritablity, Memory difficulties, Mood Swings, Obsessive thoughts, Physical 

abuse, Sexual abuse, Sleep disturbances, Suicidal ideation, Other


Eyes:  No Blurry vision, No Decreased vision, No Double vision, No Dry eyes, No 

Excessive tearing, No Eye Pain, No Itchy Eyes, No Loss of vision, No 

Photophobia, No Scotomata, No Uses contacts, No Uses glasses, No Other


HEENT:  No: Heacaches, Visual Changes, Hearing change, Nasal congestion, Nasal 

discharge, Oral lesions, Sinus pain, Sore Throat, Epistaxis, Sneezing, Snoring, 

Tinnitus, Vertigo, Vocal changes, Other


ALLERGY AND IMMUNOLOGY:  YES: Hives, Insect Bite Sensitivity; 


   No: Itchy/Watery Eyes, Nasal Congestion, Post Nasal Drip, Seasonal Allergies,

Other


Hematological and Lymphatic:  No: Bleeding Problems, Blood Clots, Blood 

Transfusions, Brusing, Night Sweats, Pallor, Swollen Lymph Nodes, Other


ENDOCRINE:  No: Breast Changes, Galactorrhea, Hair Pattern Changes, Hot Flashes,

Malaise/lethargy, Mood Swings, Palpitations, Polydipsia/polyuria, Skin Changes, 

Temperature Intolerance, Unexpected Weight Changes, Other


Breast:  No New/Changing Breast Lumps, No Nipple changes, No Nipple discharge, 

No Other


Respiratory:  No: Cough, Hemoptysis, Orthopnea, Pleuritic Pain, Shortness of 

breath, SOB with excertion, Sputum Changes, Stridor, Tachypnea, Wheezing, Other


Cardiovascular:  No Chest Pain, No Palpitations, No Orthopnea, No Paroxysmal 

Noc. Dyspnea, No Edema, No Lt Headedness, No Other


Gastrointestinal:  No Nausea, No Vomiting, No Abdominal Pain, No Diarrhea, No 

Constipation, No Melena, No Hematochezia, No Other


Genitourinary:  No Dysuria, No Frequency, No Incontinence, No Hematuria, No 

Retention, No Discharge, No Urgency, No Pain, No Flank Pain, No Other, No , No ,

No , No , No , No , No 


Neurological:  No Behavorial Changes, No Bowel/Bladder ControlChng, No Confu

ethel, No Dizziness, No Gait Disturbance, No Headaches, No Impaired 

Coord/balance, No Memory Loss, No Numbness/Tingling, No Seizures, No Speech 

Problems, No Tremors, No Visual Changes, No Weakness, No Other


Skin:  Yes Eczema; 


   No Dry Skin, No Hair Changes, No Lumps, No Mole Changes, No Mottling, No Nail

Changes, No Pruritus, No Rash, No Skin Lesion Changes, No Other, No Acne





Physical Exam


General:  Alert, Oriented X3, Cooperative, moderate distress


HEENT:  Atraumatic, PERRLA, EOMI, Mucous membr. moist/pink


Lungs:  Clear to auscultation, Normal air movement


Heart:  S1S2, RRR, no thrills, no rubs, no gallops, no murmurs


Abdomen:  Normal bowel sounds, Soft, No tenderness, No hepatosplenomegaly, No 

masses


Extremities:  No clubbing, No cyanosis, No edema, Normal pulses, No 

tenderness/swelling


Skin:  Other (Rash sparing extensor surfaces, raised wheals with yellow 

crusting)


Neuro:  Normal gait, Normal speech, Strength at 5/5 X4 ext, Normal tone, 

Sensation intact, Cranial nerves 3-12 NL, Reflexes 2+


Psych/Mental Status:  Mental status NL, Mood NL





Vitals


Vitals





Vital Signs








  Date Time  Temp Pulse Resp B/P (MAP) Pulse Ox O2 Delivery O2 Flow Rate FiO2


 


3/19/21 04:18      Room Air  


 


3/19/21 03:33 97.4 92 20 153/83 (106) 100   





 97.4       











Labs


Labs





Laboratory Tests








Test


 3/19/21


03:00


 


White Blood Count


 5.5 x10^3/uL


(4.0-11.0)


 


Red Blood Count


 4.99 x10^6/uL


(4.30-5.70)


 


Hemoglobin


 14.4 g/dL


(13.0-17.5)


 


Hematocrit


 41.7 %


(39.0-53.0)


 


Mean Corpuscular Volume 84 fL () 


 


Mean Corpuscular Hemoglobin 29 pg (25-35) 


 


Mean Corpuscular Hemoglobin


Concent 35 g/dL


(31-37)


 


Red Cell Distribution Width


 14.2 %


(11.5-14.5)


 


Platelet Count


 226 x10^3/uL


(140-400)


 


Neutrophils (%) (Auto) 47 % (31-73) 


 


Lymphocytes (%) (Auto) 21 % (24-48) 


 


Monocytes (%) (Auto) 10 % (0-9) 


 


Eosinophils (%) (Auto) 21 % (0-3) 


 


Basophils (%) (Auto) 1 % (0-3) 


 


Neutrophils # (Auto)


 2.6 x10^3/uL


(1.8-7.7)


 


Lymphocytes # (Auto)


 1.2 x10^3/uL


(1.0-4.8)


 


Monocytes # (Auto)


 0.5 x10^3/uL


(0.0-1.1)


 


Eosinophils # (Auto)


 1.2 x10^3/uL


(0.0-0.7)


 


Basophils # (Auto)


 0.1 x10^3/uL


(0.0-0.2)


 


Segmented Neutrophils % 50 % (35-66) 


 


Band Neutrophils % 2 % (0-9) 


 


Lymphocytes % 20 % (24-48) 


 


Monocytes % 10 % (0-10) 


 


Eosinophils % 18 % (0-5) 


 


Toxic Vacuolation Slight 


 


Platelet Estimate


 Adequate


(ADEQUATE)


 


Sodium Level


 136 mmol/L


(136-145)


 


Potassium Level


 4.1 mmol/L


(3.5-5.1)


 


Chloride Level


 103 mmol/L


()


 


Carbon Dioxide Level


 23 mmol/L


(21-32)


 


Anion Gap 10 (6-14) 


 


Blood Urea Nitrogen 6 mg/dL (8-26) 


 


Creatinine


 0.9 mg/dL


(0.7-1.3)


 


Estimated GFR


(Cockcroft-Gault) 101.2 





 


BUN/Creatinine Ratio 7 (6-20) 


 


Glucose Level


 94 mg/dL


(70-99)


 


Calcium Level


 8.0 mg/dL


(8.5-10.1)


 


Total Bilirubin


 1.3 mg/dL


(0.2-1.0)


 


Aspartate Amino Transf


(AST/SGOT) 39 U/L (15-37) 





 


Alanine Aminotransferase


(ALT/SGPT) 59 U/L (16-63) 





 


Alkaline Phosphatase


 63 U/L


()


 


Total Protein


 6.5 g/dL


(6.4-8.2)


 


Albumin


 3.1 g/dL


(3.4-5.0)


 


Albumin/Globulin Ratio 0.9 (1.0-1.7) 








Laboratory Tests








Test


 3/19/21


03:00


 


White Blood Count


 5.5 x10^3/uL


(4.0-11.0)


 


Red Blood Count


 4.99 x10^6/uL


(4.30-5.70)


 


Hemoglobin


 14.4 g/dL


(13.0-17.5)


 


Hematocrit


 41.7 %


(39.0-53.0)


 


Mean Corpuscular Volume 84 fL () 


 


Mean Corpuscular Hemoglobin 29 pg (25-35) 


 


Mean Corpuscular Hemoglobin


Concent 35 g/dL


(31-37)


 


Red Cell Distribution Width


 14.2 %


(11.5-14.5)


 


Platelet Count


 226 x10^3/uL


(140-400)


 


Neutrophils (%) (Auto) 47 % (31-73) 


 


Lymphocytes (%) (Auto) 21 % (24-48) 


 


Monocytes (%) (Auto) 10 % (0-9) 


 


Eosinophils (%) (Auto) 21 % (0-3) 


 


Basophils (%) (Auto) 1 % (0-3) 


 


Neutrophils # (Auto)


 2.6 x10^3/uL


(1.8-7.7)


 


Lymphocytes # (Auto)


 1.2 x10^3/uL


(1.0-4.8)


 


Monocytes # (Auto)


 0.5 x10^3/uL


(0.0-1.1)


 


Eosinophils # (Auto)


 1.2 x10^3/uL


(0.0-0.7)


 


Basophils # (Auto)


 0.1 x10^3/uL


(0.0-0.2)


 


Segmented Neutrophils % 50 % (35-66) 


 


Band Neutrophils % 2 % (0-9) 


 


Lymphocytes % 20 % (24-48) 


 


Monocytes % 10 % (0-10) 


 


Eosinophils % 18 % (0-5) 


 


Toxic Vacuolation Slight 


 


Platelet Estimate


 Adequate


(ADEQUATE)


 


Sodium Level


 136 mmol/L


(136-145)


 


Potassium Level


 4.1 mmol/L


(3.5-5.1)


 


Chloride Level


 103 mmol/L


()


 


Carbon Dioxide Level


 23 mmol/L


(21-32)


 


Anion Gap 10 (6-14) 


 


Blood Urea Nitrogen 6 mg/dL (8-26) 


 


Creatinine


 0.9 mg/dL


(0.7-1.3)


 


Estimated GFR


(Cockcroft-Gault) 101.2 





 


BUN/Creatinine Ratio 7 (6-20) 


 


Glucose Level


 94 mg/dL


(70-99)


 


Calcium Level


 8.0 mg/dL


(8.5-10.1)


 


Total Bilirubin


 1.3 mg/dL


(0.2-1.0)


 


Aspartate Amino Transf


(AST/SGOT) 39 U/L (15-37) 





 


Alanine Aminotransferase


(ALT/SGPT) 59 U/L (16-63) 





 


Alkaline Phosphatase


 63 U/L


()


 


Total Protein


 6.5 g/dL


(6.4-8.2)


 


Albumin


 3.1 g/dL


(3.4-5.0)


 


Albumin/Globulin Ratio 0.9 (1.0-1.7) 











VTE Prophylaxis Ordered


VTE Prophylaxis Devices:  No


VTE Pharmacological Prophylaxi:  No





Assessment/Plan


Assessment/Plan


A/P:


Rash - allergic dermatitis likely from multiple bedbug bites. Going on for 

greater than 1 months, will give steroids IV, benadryl, singulair. Alcohol spray

to kill his bedbugs at home


Cellulitis - in bilateral arms and legs due to excoriations. IV vancomycin 

initiated. Can transition to PO doxycycline and keflex when improved


Bedbug infestation - will search clothing. Advised on bedbug eradication 

strategies. Could prescribe permethrin on d/c, however with his skin condition 

this could worsen his rash.


Asthma - prn nebs


Diabetes-Type II - sliding scale


Hypertension - will given prn hydralazine, has not been taking his home meds





FEN- ADA diet


PPX - Ambulatory, low risk


FULL CODE


Dispo - inpatient for severe allergic reaction and severe cellulitis due to 

bedbug infestation





Justifications for Admission


Other Justification














JOSEPH GIBSON MD        Mar 19, 2021 07:38

## 2021-03-19 NOTE — NUR
The patient, DENA RENE, 26 y/o, M admitted by DENNYS HERZOG III, DO, was given written 
information regarding hospital policies, unit procedures and contact persons.  



Valuables were checked and .

## 2021-03-20 VITALS — SYSTOLIC BLOOD PRESSURE: 171 MMHG | DIASTOLIC BLOOD PRESSURE: 68 MMHG

## 2021-03-20 VITALS — DIASTOLIC BLOOD PRESSURE: 93 MMHG | SYSTOLIC BLOOD PRESSURE: 213 MMHG

## 2021-03-20 VITALS — SYSTOLIC BLOOD PRESSURE: 179 MMHG | DIASTOLIC BLOOD PRESSURE: 92 MMHG

## 2021-03-20 VITALS — SYSTOLIC BLOOD PRESSURE: 171 MMHG | DIASTOLIC BLOOD PRESSURE: 87 MMHG

## 2021-03-20 VITALS — SYSTOLIC BLOOD PRESSURE: 165 MMHG | DIASTOLIC BLOOD PRESSURE: 89 MMHG

## 2021-03-20 VITALS — SYSTOLIC BLOOD PRESSURE: 160 MMHG | DIASTOLIC BLOOD PRESSURE: 95 MMHG

## 2021-03-20 VITALS — DIASTOLIC BLOOD PRESSURE: 85 MMHG | SYSTOLIC BLOOD PRESSURE: 181 MMHG

## 2021-03-20 RX ADMIN — MONTELUKAST SODIUM SCH MG: 10 TABLET, FILM COATED ORAL at 21:20

## 2021-03-20 RX ADMIN — INSULIN LISPRO SCH UNITS: 100 INJECTION, SOLUTION INTRAVENOUS; SUBCUTANEOUS at 17:07

## 2021-03-20 RX ADMIN — ACETAMINOPHEN PRN MG: 325 TABLET, FILM COATED ORAL at 17:01

## 2021-03-20 RX ADMIN — DOXYCYCLINE HYCLATE SCH MG: 100 TABLET, COATED ORAL at 09:31

## 2021-03-20 RX ADMIN — INSULIN LISPRO SCH UNITS: 100 INJECTION, SOLUTION INTRAVENOUS; SUBCUTANEOUS at 08:00

## 2021-03-20 RX ADMIN — CEPHALEXIN SCH MG: 250 CAPSULE ORAL at 09:30

## 2021-03-20 RX ADMIN — FAMOTIDINE SCH MG: 20 TABLET ORAL at 09:30

## 2021-03-20 RX ADMIN — DOXYCYCLINE HYCLATE SCH MG: 100 TABLET, COATED ORAL at 21:20

## 2021-03-20 RX ADMIN — INSULIN LISPRO SCH UNITS: 100 INJECTION, SOLUTION INTRAVENOUS; SUBCUTANEOUS at 12:00

## 2021-03-20 RX ADMIN — FAMOTIDINE SCH MG: 20 TABLET ORAL at 21:20

## 2021-03-20 RX ADMIN — ACETAMINOPHEN PRN MG: 325 TABLET, FILM COATED ORAL at 09:29

## 2021-03-20 RX ADMIN — CEPHALEXIN SCH MG: 250 CAPSULE ORAL at 21:20

## 2021-03-20 NOTE — PDOC
[facility] [facility] PROGRESS NOTE


Date of Service


DOS:


DATE: 3/20/21 


TIME: 08:08





Chief Complaint


Chief Complaint


A/P:


Rash - allergic dermatitis likely from multiple bedbug bites. Going on for 

greater than 1 months, will give steroids IV, benadryl, singulair. Alcohol spray

to kill his bedbugs at home


Cellulitis - in bilateral arms and legs due to excoriations. IV vancomycin 

initiated. Can transition to PO doxycycline and keflex when improved


Bedbug infestation - will search clothing. Advised on bedbug eradication 

strategies. Could prescribe permethrin on d/c, however with his skin condition 

this could worsen his rash.


Asthma - prn nebs


Diabetes-Type II - sliding scale


Hypertension - will given prn hydralazine, has not been taking his home meds





FEN- ADA diet


PPX - Ambulatory, low risk


FULL CODE


Dispo - inpatient for severe allergic reaction and severe cellulitis due to 

bedbug infestation





History of Present Illness


History of Present Illness


Mr Combs is a 25  year old male w/ PMHx Asthma, Diabetes-Type II, 

Hypertension presents with a chief complaint of rash.  Rash is diffuse in nature

includes bilateral extremities torso and abdomen.  Patient states rash is been 

ongoing for 1 month progressively becoming worse.  Patient was seen in the 

emergency department 1 week ago placed on antibiotics. Rash is intensely 

pruritic, covering more than 60% of his body with crusting in multiple 

locations.


He notes he has "ticks" in his bed. Notably the pictures of the insects he 

describe are actually bedbugs. I have educated him on this.


Eosinophils 1.2x10^3, otherwise labs WNL.


Admitted for further care.





His rash has improved with steroids. Has improvement in his cellulitis, but does

have a pocket on his right lower back that has been draining. No fluctuance.





Vitals/I&O


Vitals/I&O:





                                   Vital Signs








  Date Time  Temp Pulse Resp B/P (MAP) Pulse Ox O2 Delivery O2 Flow Rate FiO2


 


3/20/21 07:00 98.0 89 20 160/95 (116) 100 Room Air  





 98.0       














                                    I & O   


 


 3/19/21 3/19/21 3/20/21





 15:00 23:00 07:00


 


Intake Total  50 ml 400 ml


 


Balance  50 ml 400 ml











Physical Exam


General:  Alert, Oriented X3, Cooperative, moderate distress


Abdomen:  Normal bowel sounds, Soft, No tenderness, No hepatosplenomegaly, No 

masses


Extremities:  No clubbing, No cyanosis, No edema, Normal pulses, No 

tenderness/swelling


Skin:  Other (Rash sparing extensor surfaces, raised wheals with yellow 

crusting)





Labs


Labs:





Laboratory Tests








Test


 3/19/21


22:23


 


Glucose (Fingerstick)


 96 mg/dL


(70-99)











Assessment and Plan


Assessmemt and Plan


Problems


Medical Problems:


(1) Cellulitis


Status: Acute  





(2) Rash and nonspecific skin eruption


Status: Acute  











Comment


Review of Relevant


I have reviewed the following items cassie (where applicable) has been applied.


Medications:





Current Medications








 Medications


  (Trade)  Dose


 Ordered  Sig/Laisha


 Route


 PRN Reason  Start Time


 Stop Time Status Last Admin


Dose Admin


 


 Montelukast Sodium


  (Singulair)  10 mg  QHS


 PO


   3/19/21 21:00


    3/19/21 20:15





 


 Diphenhydramine


 HCl


  (Benadryl)  25 mg  PRN Q6HRS  PRN


 PO


 ITCHING  3/19/21 09:00


    3/19/21 20:15





 


 Famotidine


  (Pepcid)  20 mg  BID


 PO


   3/19/21 09:00


    3/19/21 20:15





 


 Prednisone


  (Prednisone)  40 mg  DAILY


 PO


   3/19/21 11:30


    3/19/21 12:10





 


 Doxycycline


 Hyclate


  (Vibra-Tab)  100 mg  BID


 PO


   3/19/21 22:00


    3/19/21 22:39














Justifications for Admission


Other Justification














JOSEPH GIBSON MD        Mar 20, 2021 08:09

## 2021-03-21 VITALS — SYSTOLIC BLOOD PRESSURE: 170 MMHG | DIASTOLIC BLOOD PRESSURE: 81 MMHG

## 2021-03-21 VITALS — DIASTOLIC BLOOD PRESSURE: 81 MMHG | SYSTOLIC BLOOD PRESSURE: 152 MMHG

## 2021-03-21 VITALS — SYSTOLIC BLOOD PRESSURE: 180 MMHG | DIASTOLIC BLOOD PRESSURE: 88 MMHG

## 2021-03-21 VITALS — SYSTOLIC BLOOD PRESSURE: 182 MMHG | DIASTOLIC BLOOD PRESSURE: 84 MMHG

## 2021-03-21 VITALS — SYSTOLIC BLOOD PRESSURE: 188 MMHG | DIASTOLIC BLOOD PRESSURE: 71 MMHG

## 2021-03-21 VITALS — DIASTOLIC BLOOD PRESSURE: 84 MMHG | SYSTOLIC BLOOD PRESSURE: 145 MMHG

## 2021-03-21 RX ADMIN — FAMOTIDINE SCH MG: 20 TABLET ORAL at 08:34

## 2021-03-21 RX ADMIN — TRIAMCINOLONE ACETONIDE SCH APP: 1 CREAM TOPICAL at 20:53

## 2021-03-21 RX ADMIN — DOXYCYCLINE HYCLATE SCH MG: 100 TABLET, COATED ORAL at 08:36

## 2021-03-21 RX ADMIN — INSULIN LISPRO SCH UNITS: 100 INJECTION, SOLUTION INTRAVENOUS; SUBCUTANEOUS at 08:00

## 2021-03-21 RX ADMIN — CEPHALEXIN SCH MG: 250 CAPSULE ORAL at 08:36

## 2021-03-21 RX ADMIN — MONTELUKAST SODIUM SCH MG: 10 TABLET, FILM COATED ORAL at 20:52

## 2021-03-21 RX ADMIN — CEPHALEXIN SCH MG: 250 CAPSULE ORAL at 20:52

## 2021-03-21 RX ADMIN — Medication PRN APP: at 16:32

## 2021-03-21 RX ADMIN — Medication PRN APP: at 20:53

## 2021-03-21 RX ADMIN — FAMOTIDINE SCH MG: 20 TABLET ORAL at 20:51

## 2021-03-21 RX ADMIN — INSULIN LISPRO SCH UNITS: 100 INJECTION, SOLUTION INTRAVENOUS; SUBCUTANEOUS at 12:00

## 2021-03-21 RX ADMIN — DOXYCYCLINE HYCLATE SCH MG: 100 TABLET, COATED ORAL at 20:52

## 2021-03-21 RX ADMIN — INSULIN LISPRO SCH UNITS: 100 INJECTION, SOLUTION INTRAVENOUS; SUBCUTANEOUS at 17:00

## 2021-03-21 NOTE — PDOC
GENERAL


General:


Patient examined chart reviewed today is hospital day 3 for this patient with a

bout 2 weeks of escalating widespread exfoliative dermatitis that started on his

bilateral upper extremities and now have spread to his trunk both anterior and 

posterior as well as his bilateral lower extremities anterior and posterior.  

Rash appears to spare his mucous membranes in his genital area.  There is no 

axillary findings.  Rashes not painful or itchy just very disruptive because of 

the widespread nature.  Patient has skin all over the floor of his room.  Tells 

me that he has not had any change in his medications including that he has not 

had any herbal supplements or nutraceuticals.  He has not changed his skin 

hygiene regimen or his detergents.  He works in a warehouse handling packages 

but does not handle the direct product.  There are some chemicals there but has 

not had any exposure.  To his knowledge he has not had any exposure to anybody 

with Covid.  He has not been tested here for Covid.  He was started on 

antibiotics in the emergency department a week prior to this admission with just

slight improvement in the redness but the widespread nature of the rash 

persisted.  I spoke with nursing and she tells me that she will work on trying 

to get dermatology consult here it does not sound like that has been as 

available of late.  Patient did have a significant eosinophilia and monocytosis 

on admission.  We will repeat labs in the morning including inflammatory markers

and rheumatologic assays.  I would also suggest a CT chest abdomen pelvis to 

rule out underlying occult tumor.  This does not appear to be secondary to 

arthropod bites though may have started out looking that way.  The widespread 

shedding of his skin is concerning.  It also does not appear to have the bullous

aspect of Sparrow-Benjamin syndrome.  Certainly a dermatology consult would be v

sara helpful here.  Total time today is 35 minutes with greater than 50% in 

counseling and coordination of care most of which in discussion with patient and

nurse.


Problems:  


(1) Exfoliative dermatitis


(2) Monocytosis


(3) Eosinophilia





VITAL SIGNS


Vital Signs/I&O:





                                   Vital Signs








  Date Time  Temp Pulse Resp B/P (MAP) Pulse Ox O2 Delivery O2 Flow Rate FiO2


 


3/21/21 15:16 98.4 94 18 145/84 (104) 98 Room Air  





 98.4       














                                    I & O   


 


 3/20/21 3/20/21 3/21/21





 15:00 23:00 07:00


 


Intake Total 900 ml 800 ml 0 ml


 


Balance 900 ml 800 ml 0 ml





In general the patient is pleasant comfortable alert and oriented x3 no acute 

distress


HEENT exam is notable for exfoliation and is forehead and upper nose.  Mucous 

membranes appear to be spared


Neck is soft and supple no adenopathy or thyromegaly noted


Chest is clear to auscultation


Heart S1-S2 normal regular rate and rhythm no murmurs or gallops are noted


Abdomen is severely obese no masses organomegaly noted


Extremity exam is notable for widespread exfoliative dermatitis with 

erythematous sloughing and cracked areas.  No obvious superimposed cellulitis is

noted though he has been on oral steroids and antibiotics for the last several d

ays





ALLERGIES


Allergies:





Allergies








Coded Allergies Type Severity Reaction Last Updated Verified


 


  Penicillins Allergy Intermediate  3/20/21 Yes











MEDS


Medications:





Current Medications








 Medications


  (Trade)  Dose


 Ordered  Sig/Laisha  Start Time


 Stop Time Status Last Admin


Dose Admin


 


 Acetaminophen


  (Tylenol)  650 mg  PRN Q6HRS  PRN  3/19/21 21:00


    3/20/21 17:01





 


 Amlodipine


 Besylate


  (Norvasc)  5 mg  DAILY  3/21/21 14:30


     





 


 Cephalexin HCl


  (Keflex)  500 mg  BID  3/20/21 09:00


    3/21/21 08:36





 


 Dextrose


  (Dextrose


 50%-Water Syringe)  12.5 gm  PRN Q15MIN  PRN  3/19/21 22:00


     





 


 Diphenhydramine


 HCl


  (Benadryl)  25 mg  PRN Q6HRS  PRN  3/19/21 09:00


    3/21/21 08:34





 


 Doxycycline


 Hyclate


  (Vibra-Tab)  100 mg  BID  3/19/21 22:00


    3/21/21 08:36





 


 Famotidine


  (Pepcid)  20 mg  BID  3/19/21 09:00


    3/21/21 08:34





 


 Hydralazine HCl


  (Apresoline Inj)  10 mg  PRN Q4HRS  PRN  3/20/21 16:00


 3/20/21 17:57 DC  





 


 Hydralazine HCl


  (Apresoline)  10 mg  PRN TID  PRN  3/20/21 18:00


    3/21/21 04:19





 


 Insulin Human


 Lispro


  (HumaLOG)  0-9 UNITS  TIDWMEALS  3/20/21 08:00


    3/20/21 17:07





 


 Methylprednisolone


 Sodium Succinate


  (SOLU-Medrol


 40MG VIAL)  40 mg  Q8HRS  3/19/21 14:00


 3/19/21 11:15 DC  





 


 Montelukast Sodium


  (Singulair)  10 mg  1X  ONCE  3/19/21 07:45


 3/19/21 07:46 DC 3/19/21 09:11





 


 Multi-Ingredient


 Ointment


  (Hydrocerin,


 Eucerin Cream)  1 jose g  PRN Q1HR  PRN  3/21/21 15:15


   UNV  





 


 Prednisone


  (Prednisone)  40 mg  DAILY  3/19/21 11:30


    3/21/21 08:36





 


 Vancomycin HCl


 1.25 gm/Sodium


 Chloride  250 ml @ 


 166.667


 mls/hr  1X  ONCE  3/19/21 02:00


 3/19/21 03:29 DC 3/19/21 03:02











Current Medications








 Medications


  (Trade)  Dose


 Ordered  Sig/Laisha


 Route


 PRN Reason  Start Time


 Stop Time Status Last Admin


Dose Admin


 


 Hydralazine HCl


  (Apresoline)  10 mg  PRN TID  PRN


 PO


 FOR SBP > 160  3/20/21 18:00


    3/21/21 04:19














LAB


Lab:





                                Laboratory Tests








Test


 3/20/21


16:19 3/20/21


20:55 3/21/21


07:39 3/21/21


11:43


 


Glucose (Fingerstick)


 165 mg/dL


(70-99)  H 88 mg/dL


(70-99) 77 mg/dL


(70-99) 85 mg/dL


(70-99)











ASSESSMENT & PLAN


A&P


Plan as noted above








This note was created using [employer] and may have omissions and/or 

errors due to the nature of real-time voice transcription.





Justifications for Admission


Other Justification














MELISSA PARIKH MD                Mar 21, 2021 15:37

## 2021-03-22 VITALS — DIASTOLIC BLOOD PRESSURE: 82 MMHG | SYSTOLIC BLOOD PRESSURE: 158 MMHG

## 2021-03-22 VITALS — DIASTOLIC BLOOD PRESSURE: 81 MMHG | SYSTOLIC BLOOD PRESSURE: 159 MMHG

## 2021-03-22 VITALS — SYSTOLIC BLOOD PRESSURE: 135 MMHG | DIASTOLIC BLOOD PRESSURE: 76 MMHG

## 2021-03-22 VITALS — SYSTOLIC BLOOD PRESSURE: 144 MMHG | DIASTOLIC BLOOD PRESSURE: 72 MMHG

## 2021-03-22 VITALS — DIASTOLIC BLOOD PRESSURE: 63 MMHG | SYSTOLIC BLOOD PRESSURE: 138 MMHG

## 2021-03-22 VITALS — DIASTOLIC BLOOD PRESSURE: 69 MMHG | SYSTOLIC BLOOD PRESSURE: 168 MMHG

## 2021-03-22 LAB
ALBUMIN SERPL-MCNC: 3.2 G/DL (ref 3.4–5)
ALBUMIN/GLOB SERPL: 0.9 {RATIO} (ref 1–1.7)
ALP SERPL-CCNC: 59 U/L (ref 46–116)
ALT SERPL-CCNC: 64 U/L (ref 16–63)
ANION GAP SERPL CALC-SCNC: 7 MMOL/L (ref 6–14)
AST SERPL-CCNC: 33 U/L (ref 15–37)
BASOPHILS # BLD AUTO: 0.1 X10^3/UL (ref 0–0.2)
BASOPHILS NFR BLD: 1 % (ref 0–3)
BILIRUB SERPL-MCNC: 1.1 MG/DL (ref 0.2–1)
BUN SERPL-MCNC: 9 MG/DL (ref 8–26)
BUN/CREAT SERPL: 10 (ref 6–20)
CALCIUM SERPL-MCNC: 8.6 MG/DL (ref 8.5–10.1)
CHLORIDE SERPL-SCNC: 105 MMOL/L (ref 98–107)
CO2 SERPL-SCNC: 32 MMOL/L (ref 21–32)
CREAT SERPL-MCNC: 0.9 MG/DL (ref 0.7–1.3)
CRP SERPL-MCNC: 1.5 MG/L (ref 0–3.3)
EOSINOPHIL NFR BLD: 0.1 X10^3/UL (ref 0–0.7)
EOSINOPHIL NFR BLD: 2 % (ref 0–3)
ERYTHROCYTE [DISTWIDTH] IN BLOOD BY AUTOMATED COUNT: 14.2 % (ref 11.5–14.5)
GFR SERPLBLD BASED ON 1.73 SQ M-ARVRAT: 101.2 ML/MIN
GLUCOSE SERPL-MCNC: 80 MG/DL (ref 70–99)
HCT VFR BLD CALC: 39.8 % (ref 39–53)
HGB BLD-MCNC: 13.1 G/DL (ref 13–17.5)
LYMPHOCYTES # BLD: 1.8 X10^3/UL (ref 1–4.8)
LYMPHOCYTES NFR BLD AUTO: 32 % (ref 24–48)
MCH RBC QN AUTO: 28 PG (ref 25–35)
MCHC RBC AUTO-ENTMCNC: 33 G/DL (ref 31–37)
MCV RBC AUTO: 86 FL (ref 79–100)
MONO #: 0.5 X10^3/UL (ref 0–1.1)
MONOCYTES NFR BLD: 9 % (ref 0–9)
NEUT #: 3.2 X10^3/UL (ref 1.8–7.7)
NEUTROPHILS NFR BLD AUTO: 56 % (ref 31–73)
PLATELET # BLD AUTO: 232 X10^3/UL (ref 140–400)
POTASSIUM SERPL-SCNC: 3.8 MMOL/L (ref 3.5–5.1)
PROT SERPL-MCNC: 6.8 G/DL (ref 6.4–8.2)
RBC # BLD AUTO: 4.63 X10^6/UL (ref 4.3–5.7)
RHEUMATOID FACT SERPL-ACNC: 36.6 IU/ML (ref 0–13.9)
SODIUM SERPL-SCNC: 144 MMOL/L (ref 136–145)
WBC # BLD AUTO: 5.8 X10^3/UL (ref 4–11)

## 2021-03-22 RX ADMIN — MONTELUKAST SODIUM SCH MG: 10 TABLET, FILM COATED ORAL at 20:53

## 2021-03-22 RX ADMIN — FAMOTIDINE SCH MG: 20 TABLET ORAL at 20:53

## 2021-03-22 RX ADMIN — FAMOTIDINE SCH MG: 20 TABLET ORAL at 09:05

## 2021-03-22 RX ADMIN — INSULIN LISPRO SCH UNITS: 100 INJECTION, SOLUTION INTRAVENOUS; SUBCUTANEOUS at 12:00

## 2021-03-22 RX ADMIN — DOXYCYCLINE HYCLATE SCH MG: 100 TABLET, COATED ORAL at 09:04

## 2021-03-22 RX ADMIN — TRIAMCINOLONE ACETONIDE SCH APP: 1 CREAM TOPICAL at 09:05

## 2021-03-22 RX ADMIN — INSULIN LISPRO SCH UNITS: 100 INJECTION, SOLUTION INTRAVENOUS; SUBCUTANEOUS at 17:00

## 2021-03-22 RX ADMIN — CEPHALEXIN SCH MG: 250 CAPSULE ORAL at 20:53

## 2021-03-22 RX ADMIN — TRIAMCINOLONE ACETONIDE SCH APP: 1 CREAM TOPICAL at 20:53

## 2021-03-22 RX ADMIN — DOXYCYCLINE HYCLATE SCH MG: 100 TABLET, COATED ORAL at 20:53

## 2021-03-22 RX ADMIN — CEPHALEXIN SCH MG: 250 CAPSULE ORAL at 09:04

## 2021-03-22 RX ADMIN — TRIAMCINOLONE ACETONIDE SCH APP: 1 CREAM TOPICAL at 13:54

## 2021-03-22 RX ADMIN — INSULIN LISPRO SCH UNITS: 100 INJECTION, SOLUTION INTRAVENOUS; SUBCUTANEOUS at 08:00

## 2021-03-22 NOTE — NUR
Wound Care

Pt seen for wound care consult re: full body rash. Pt assessed, has areas of dry, reddened, 
flaky rash over arms and legs, arms with several areas of dry splitting skin. No open wounds 
noted, pt would benefit from continued Eucerin cream prn multiple times/day. Will defer to 
ID for further orders, wound care will sign off at this time.

## 2021-03-22 NOTE — PDOC2
CONSULT


Date of Consult


Date of Consult


DATE: 3/22/21 


TIME: 11:56





Reason for Consult


Reason for Consult:


DERMATITIS





Referring Physician


Referring Physician:


DR Tena





Identification/Chief Complaint


Chief Complaint


RASH





History of Present Illness


Reason for Visit:


25  year old male with past medical history of diabetes mellitus, diet-

controlled, asthma, morbid obesity, hypertension presented with complaints of 

rash bilateral upper extremity and upper torso and abdomen for 1 month.  He was 

seen in ER on 3/12/2021 at which time he received IV Solu-Medrol with 

clindamycin.  His rash got worse.  Rashes pruritic.   He received a dose of 

vancomycin.  Currently is on Keflex, doxycycline, triamcinolone and steroids. 

White count remains normal though he has eosinophilia.  Patient has abnormal 

LFTs.Denies taking any new medication around the time of new onset of rash.


   Patient feels a little better. Denies any fevers.  Denies chills, nausea, 

vomiting, diarrhea, abdominal pain, worsening shortness of breath or cough.  

Denies any oral sores or  symptoms.





Past Medical History


Cardiovascular:  HTN


Pulmonary:  Asthma


Endocrine:  Diabetes





Family History


Family History:  Diabetes, Hypertension





Social History


Social History


No pets





[place]arehouse





Sexually active with same partner





No history of STDs or hepatitis


<1 pack per day


ALCOHOL:  none


Drugs:  None


Lives:  Alone





Current Problem List


Problem List


Problems


Medical Problems:


(1) Cellulitis


Status: Acute  





(2) Rash and nonspecific skin eruption


Status: Acute  











Current Medications


Current Medications





Current Medications


Vancomycin HCl 1.25 gm/Sodium Chloride 250 ml @  166.667 mls/hr 1X  ONCE IV  

Last administered on 3/19/21at 03:02;  Start 3/19/21 at 02:00;  Stop 3/19/21 at 

03:29;  Status DC


Montelukast Sodium (Singulair) 10 mg QHS PO  Last administered on 3/21/21at 

20:52;  Start 3/19/21 at 21:00


Montelukast Sodium (Singulair) 10 mg 1X  ONCE PO  Last administered on 3/19/21at

09:11;  Start 3/19/21 at 07:45;  Stop 3/19/21 at 07:46;  Status DC


Diphenhydramine HCl (Benadryl) 25 mg PRN Q6HRS  PRN PO ITCHING Last administered

on 3/21/21at 08:34;  Start 3/19/21 at 09:00


Methylprednisolone Sodium Succinate (SOLU-Medrol 40MG VIAL) 40 mg Q8HRS IV ;  

Start 3/19/21 at 14:00;  Stop 3/19/21 at 11:15;  Status DC


Famotidine (Pepcid) 20 mg BID PO  Last administered on 3/22/21at 09:05;  Start 

3/19/21 at 09:00


Prednisone (Prednisone) 40 mg DAILY PO  Last administered on 3/22/21at 09:04;  

Start 3/19/21 at 11:30


Acetaminophen (Tylenol) 650 mg PRN Q6HRS  PRN PO MILD PAIN / TEMP > 100.3'F Last

administered on 3/20/21at 17:01;  Start 3/19/21 at 21:00


Insulin Human Lispro (HumaLOG) 0-9 UNITS TIDWMEALS SQ  Last administered on 

3/20/21at 17:07;  Start 3/20/21 at 08:00


Dextrose (Dextrose 50%-Water Syringe) 12.5 gm PRN Q15MIN  PRN IV SEE COMMENTS;  

Start 3/19/21 at 22:00


Doxycycline Hyclate (Vibra-Tab) 100 mg BID PO  Last administered on 3/22/21at 

09:04;  Start 3/19/21 at 22:00


Cephalexin HCl (Keflex) 500 mg BID PO  Last administered on 3/22/21at 09:04;  

Start 3/20/21 at 09:00


Hydralazine HCl (Apresoline Inj) 10 mg PRN Q4HRS  PRN IVP ELEVATED BP, SEE 

COMMENTS;  Start 3/20/21 at 16:00;  Stop 3/20/21 at 17:57;  Status DC


Hydralazine HCl (Apresoline) 10 mg PRN TID  PRN PO FOR SBP > 160 Last 

administered on 3/21/21at 20:52;  Start 3/20/21 at 18:00


Amlodipine Besylate (Norvasc) 5 mg DAILY PO  Last administered on 3/22/21at 

09:05;  Start 3/21/21 at 14:30


Multi-Ingredient Ointment (Hydrocerin, Eucerin Cream) 1 jose g PRN Q1HR  PRN TP DRY

SKIN / SCALING Last administered on 3/21/21at 20:53;  Start 3/21/21 at 15:15


Triamcinolone Acetonide (Kenalog 0.1%) 1 jose g TID TP  Last administered on 

3/22/21at 09:05;  Start 3/21/21 at 21:00


Iohexol (Omnipaque 300 Mg/ml) 75 ml 1X  ONCE IV  Last administered on 3/22/21at 

08:00;  Start 3/22/21 at 08:00;  Stop 3/22/21 at 08:01;  Status DC


Iohexol (Omnipaque 240 Mg/ml) 50 ml 1X  ONCE PO  Last administered on 3/22/21at 

08:26;  Start 3/22/21 at 08:00;  Stop 3/22/21 at 08:01;  Status DC


Info (CONTRAST GIVEN -- Rx MONITORING) 1 each PRN DAILY  PRN MC SEE COMMENTS;  

Start 3/22/21 at 08:00;  Stop 3/24/21 at 07:59





Active Scripts


Active





Allergies


Allergies:  


Coded Allergies:  


     Penicillins (Verified  Allergy, Intermediate, 3/20/21)





Physical Exam


General:  Alert, Oriented X3


HEENT:  Atraumatic, PERRLA, EOMI, Mucous membr. moist/pink


Lungs:  Clear to auscultation


Heart:  Normal S1, Normal S2


Abdomen:  Normal bowel sounds, Soft, No tenderness


Extremities:  No edema, Other


Skin:  Other (Diffuse maculopapular rash with dry scaly skin mainly over both up

per extremity, neck, upper chest and abdomen)


Neuro:  Normal speech, Strength at 5/5 X4 ext, Normal tone, Cranial nerves 3-12 

NL


Psych/Mental Status:  Mental status NL, Mood NL


MUSCULOSKELETAL:  Full range of motion without pain





Vitals


VITALS





Vital Signs








  Date Time  Temp Pulse Resp B/P (MAP) Pulse Ox O2 Delivery O2 Flow Rate FiO2


 


3/22/21 10:58 97.9 81 16 135/76 (95) 98 Room Air  





 97.9       











Labs


Labs





Laboratory Tests








Test


 3/20/21


16:19 3/20/21


20:55 3/21/21


07:39 3/21/21


11:43


 


Glucose (Fingerstick)


 165 mg/dL


(70-99) 88 mg/dL


(70-99) 77 mg/dL


(70-99) 85 mg/dL


(70-99)


 


Test


 3/21/21


16:47 3/21/21


20:19 3/22/21


07:40 3/22/21


07:55


 


Glucose (Fingerstick)


 121 mg/dL


(70-99) 116 mg/dL


(70-99) 


 82 mg/dL


(70-99)


 


White Blood Count


 


 


 5.8 x10^3/uL


(4.0-11.0) 





 


Red Blood Count


 


 


 4.63 x10^6/uL


(4.30-5.70) 





 


Hemoglobin


 


 


 13.1 g/dL


(13.0-17.5) 





 


Hematocrit


 


 


 39.8 %


(39.0-53.0) 





 


Mean Corpuscular Volume   86 fL ()  


 


Mean Corpuscular Hemoglobin   28 pg (25-35)  


 


Mean Corpuscular Hemoglobin


Concent 


 


 33 g/dL


(31-37) 





 


Red Cell Distribution Width


 


 


 14.2 %


(11.5-14.5) 





 


Platelet Count


 


 


 232 x10^3/uL


(140-400) 





 


Neutrophils (%) (Auto)   56 % (31-73)  


 


Lymphocytes (%) (Auto)   32 % (24-48)  


 


Monocytes (%) (Auto)   9 % (0-9)  


 


Eosinophils (%) (Auto)   2 % (0-3)  


 


Basophils (%) (Auto)   1 % (0-3)  


 


Neutrophils # (Auto)


 


 


 3.2 x10^3/uL


(1.8-7.7) 





 


Lymphocytes # (Auto)


 


 


 1.8 x10^3/uL


(1.0-4.8) 





 


Monocytes # (Auto)


 


 


 0.5 x10^3/uL


(0.0-1.1) 





 


Eosinophils # (Auto)


 


 


 0.1 x10^3/uL


(0.0-0.7) 





 


Basophils # (Auto)


 


 


 0.1 x10^3/uL


(0.0-0.2) 





 


Erythrocyte Sedimentation Rate   10 (0-15)  


 


Sodium Level


 


 


 144 mmol/L


(136-145) 





 


Potassium Level


 


 


 3.8 mmol/L


(3.5-5.1) 





 


Chloride Level


 


 


 105 mmol/L


() 





 


Carbon Dioxide Level


 


 


 32 mmol/L


(21-32) 





 


Anion Gap   7 (6-14)  


 


Blood Urea Nitrogen   9 mg/dL (8-26)  


 


Creatinine


 


 


 0.9 mg/dL


(0.7-1.3) 





 


Estimated GFR


(Cockcroft-Gault) 


 


 101.2 


 





 


BUN/Creatinine Ratio   10 (6-20)  


 


Glucose Level


 


 


 80 mg/dL


(70-99) 





 


Calcium Level


 


 


 8.6 mg/dL


(8.5-10.1) 





 


Total Bilirubin


 


 


 1.1 mg/dL


(0.2-1.0) 





 


Aspartate Amino Transf


(AST/SGOT) 


 


 33 U/L (15-37) 


 





 


Alanine Aminotransferase


(ALT/SGPT) 


 


 64 U/L (16-63) 


 





 


Alkaline Phosphatase


 


 


 59 U/L


() 





 


C-Reactive Protein,


Quantitative 


 


 1.5 mg/L


(0-3.3) 





 


Total Protein


 


 


 6.8 g/dL


(6.4-8.2) 





 


Albumin


 


 


 3.2 g/dL


(3.4-5.0) 





 


Albumin/Globulin Ratio   0.9 (1.0-1.7)  


 


Vitamin B12 Level


 


 


 575 pg/mL


(247-911) 





 


Thyroid Stimulating Hormone


(TSH) 


 


 3.210 uIU/mL


(0.358-3.74) 





 


HIV (1&2) Antibody Screen


 


 


 Nonreactive


(Nonreactive) 











Laboratory Tests








Test


 3/21/21


16:47 3/21/21


20:19 3/22/21


07:40 3/22/21


07:55


 


Glucose (Fingerstick)


 121 mg/dL


(70-99) 116 mg/dL


(70-99) 


 82 mg/dL


(70-99)


 


White Blood Count


 


 


 5.8 x10^3/uL


(4.0-11.0) 





 


Red Blood Count


 


 


 4.63 x10^6/uL


(4.30-5.70) 





 


Hemoglobin


 


 


 13.1 g/dL


(13.0-17.5) 





 


Hematocrit


 


 


 39.8 %


(39.0-53.0) 





 


Mean Corpuscular Volume   86 fL ()  


 


Mean Corpuscular Hemoglobin   28 pg (25-35)  


 


Mean Corpuscular Hemoglobin


Concent 


 


 33 g/dL


(31-37) 





 


Red Cell Distribution Width


 


 


 14.2 %


(11.5-14.5) 





 


Platelet Count


 


 


 232 x10^3/uL


(140-400) 





 


Neutrophils (%) (Auto)   56 % (31-73)  


 


Lymphocytes (%) (Auto)   32 % (24-48)  


 


Monocytes (%) (Auto)   9 % (0-9)  


 


Eosinophils (%) (Auto)   2 % (0-3)  


 


Basophils (%) (Auto)   1 % (0-3)  


 


Neutrophils # (Auto)


 


 


 3.2 x10^3/uL


(1.8-7.7) 





 


Lymphocytes # (Auto)


 


 


 1.8 x10^3/uL


(1.0-4.8) 





 


Monocytes # (Auto)


 


 


 0.5 x10^3/uL


(0.0-1.1) 





 


Eosinophils # (Auto)


 


 


 0.1 x10^3/uL


(0.0-0.7) 





 


Basophils # (Auto)


 


 


 0.1 x10^3/uL


(0.0-0.2) 





 


Erythrocyte Sedimentation Rate   10 (0-15)  


 


Sodium Level


 


 


 144 mmol/L


(136-145) 





 


Potassium Level


 


 


 3.8 mmol/L


(3.5-5.1) 





 


Chloride Level


 


 


 105 mmol/L


() 





 


Carbon Dioxide Level


 


 


 32 mmol/L


(21-32) 





 


Anion Gap   7 (6-14)  


 


Blood Urea Nitrogen   9 mg/dL (8-26)  


 


Creatinine


 


 


 0.9 mg/dL


(0.7-1.3) 





 


Estimated GFR


(Cockcroft-Gault) 


 


 101.2 


 





 


BUN/Creatinine Ratio   10 (6-20)  


 


Glucose Level


 


 


 80 mg/dL


(70-99) 





 


Calcium Level


 


 


 8.6 mg/dL


(8.5-10.1) 





 


Total Bilirubin


 


 


 1.1 mg/dL


(0.2-1.0) 





 


Aspartate Amino Transf


(AST/SGOT) 


 


 33 U/L (15-37) 


 





 


Alanine Aminotransferase


(ALT/SGPT) 


 


 64 U/L (16-63) 


 





 


Alkaline Phosphatase


 


 


 59 U/L


() 





 


C-Reactive Protein,


Quantitative 


 


 1.5 mg/L


(0-3.3) 





 


Total Protein


 


 


 6.8 g/dL


(6.4-8.2) 





 


Albumin


 


 


 3.2 g/dL


(3.4-5.0) 





 


Albumin/Globulin Ratio   0.9 (1.0-1.7)  


 


Vitamin B12 Level


 


 


 575 pg/mL


(247-911) 





 


Thyroid Stimulating Hormone


(TSH) 


 


 3.210 uIU/mL


(0.358-3.74) 





 


HIV (1&2) Antibody Screen


 


 


 Nonreactive


(Nonreactive) 














Assessment/Plan


Assessment/Plan


Dermatitis


Eosinophilia


Asthma


Diabetes mellitus diet controlled


Tobaccoism


Abnormal LFTs


Morbid obesity


Penicillin allergy with hives, tolerating cephalosporins well








Recommendations





Continue current antibiotics, keflex and doxycycline


Continue local care


Follow-up CT abdomen and pelvis


Monitor labs and cultures


Patient will need dermatology evaluation 


Optimal diabetes control


Steroids per primary


Follow-up infectious disease serology





Discussed with nursing staff








Q for allowing me to participate in this patient's care. If you have any 

questions do not hesitate to contact me.











CONSTANTINO CAN MD           Mar 22, 2021 12:04

## 2021-03-22 NOTE — RAD
EXAM: CT Chest, Abdomen, and Pelvis with IV contrast



INDICATION: Reason: exfoliative widespread dermatitis with monocytosis evaluate tumor / Spl. Instruct
ions: IV OMNI 300 75 MLS AND PO OMNI 240 50 MLS / History: 



TECHNIQUE:  Multi-detector row CT images were acquired from the thoracic inlet through the ischial tu
berosities with the use of IV contrast. Sagittal and coronal images were acquired from the transaxial
 data. All CT scans performed at this facility utilize dose optimization techniques as appropriate to
 the exam, including the following: Automated exposure control and adjustment of the mA and/or KV acc
ording to patient size (this includes techniques or standardized protocols for targeted exams where d
ose is indication/reason for exam).



IV CONTRAST: Administered



ORAL CONTRAST: Administered



COMPARISON: None



FINDINGS: 



CHEST:

CARDIOVASCULAR:  Unremarkable



MEDIASTINUM & PATRICK: No adenopathy or masses.



LUNGS: No pulmonary infiltrate, nodule, or other focal abnormality.



PLEURAL SPACE: No pleural effusions or pneumothorax.



OSSEOUS & SOFT TISSUE: Unremarkable



ABDOMEN/PELVIS:

LIVER:  Unremarkable



BILIARY SYSTEM: Gallbladder is unremarkable. Bile ducts are not dilated.



PANCREAS:  Unremarkable



SPLEEN:  Unremarkable



ADRENALS:  Unremarkable



KIDNEYS & URETERS:  Unremarkable



BLADDER:  Unremarkable



REPRODUCTIVE ORGANS:  Unremarkable



GASTROINTESTINAL: Extensive colonic diverticulosis. The stomach, small bowel, and colon are otherwise
 unremarkable. The appendix is normal.



MESENTERY/PERITONEUM/RETROPERITONEUM: Unremarkable



VASCULAR:  Unremarkable



LYMPH NODES:  No adenopathy



OSSEOUS & SOFT TISSUES:  Unremarkable



IMPRESSION:



Essentially unremarkable CT of the chest,  abdomen, and pelvis with no evidence of malignancy.



Electronically signed by: Venkatesh Melvin MD (3/22/2021 10:08 AM) [id number]

## 2021-03-22 NOTE — PDOC
PROGRESS NOTES


Date of Service:


DATE: 3/22/21 


TIME: 08:06





Chief Complaint


Chief Complaint


impression ===============








Rash - allergic dermatitis likely from multiple bedbug bites. Going on for 

greater than 1 months, will give steroids IV, benadryl, singulair. Alcohol spray

to kill his bedbugs at home


Cellulitis - in bilateral arms and legs due to excoriations. IV vancomycin 

initiated. rx  PO doxycycline and keflex when improved


Bedbug infestation - will search clothing. Advised on bedbug eradication 

strategies. Could prescribe permethrin on d/c, however with his skin condition 

this could worsen his rash.


Asthma - prn nebs


Diabetes-Type II - sliding scale


Hypertension - will given prn hydralazine, has not been taking his home meds


hiv neg screen 











plan=======================





FEN- ADA diet


PPX - Ambulatory, low risk


FULL CODE


Dispo - inpatient for severe allergic reaction and severe cellulitis due to 

bedbug infestation


ID CONSULT


Dermatology consult as out patient





History of Present Illness


History of Present Illness


Mr Combs is a 25  year old male w/ PMHx Asthma, Diabetes-Type II, 

Hypertension presents with a chief complaint of rash.  Rash is diffuse in nature

includes bilateral extremities torso and abdomen.  Patient states rash is been 

ongoing for 1 month progressively becoming worse.  Patient was seen in the 

emergency department 1 week ago placed on antibiotics. Rash is intensely 

pruritic, covering more than 60% of his body with crusting in multiple 

locations.


He notes he has "ticks" in his bed. Notably the pictures of the insects he 

describe are actually bedbugs. I have educated him on this.


Eosinophils 1.2x10^3, otherwise labs WNL.


Admitted for further care.





His rash has improved with steroids. Has improvement in his cellulitis, but does

have a pocket on his right lower back that has been draining. No fluctuance.





Vitals


Vitals





Vital Signs








  Date Time  Temp Pulse Resp B/P (MAP) Pulse Ox O2 Delivery O2 Flow Rate FiO2


 


3/22/21 02:40 98.0 65 17 144/72 (96) 96 Room Air  





 98.0       











Physical Exam


General:  Alert, Oriented X3, Cooperative, moderate distress


Abdomen:  Normal bowel sounds, Soft, No tenderness, No hepatosplenomegaly, No 

masses


Extremities:  No clubbing, No cyanosis, No edema, Normal pulses, No 

tenderness/swelling


Skin:  Other (Rash sparing extensor surfaces, raised wheals with yellow 

crusting)





Labs


LABS





INDICATION: Reason: exfoliative widespread dermatitis with monocytosis evaluate 

tumor / Spl. Instructions: IV OMNI 300 75 MLS AND PO OMNI 240 50 MLS / History: 





TECHNIQUE:  Multi-detector row CT images were acquired from the thoracic inlet 

through the ischial tuberosities with the use of IV contrast. Sagittal and 

coronal images were acquired from the transaxial data. All CT scans performed at

this facility utilize dose optimization techniques as appropriate to the exam, 

including the following: Automated exposure control and adjustment of the mA 

and/or KV according to patient size (this includes techniques or standardized 

protocols for targeted exams where dose is indication/reason for exam).





IV CONTRAST: Administered





ORAL CONTRAST: Administered





COMPARISON: None





FINDINGS: 





CHEST:


CARDIOVASCULAR:  Unremarkable





MEDIASTINUM & PATRICK: No adenopathy or masses.





LUNGS: No pulmonary infiltrate, nodule, or other focal abnormality.





PLEURAL SPACE: No pleural effusions or pneumothorax.





OSSEOUS & SOFT TISSUE: Unremarkable





ABDOMEN/PELVIS:


LIVER:  Unremarkable





BILIARY SYSTEM: Gallbladder is unremarkable. Bile ducts are not dilated.





PANCREAS:  Unremarkable





SPLEEN:  Unremarkable





ADRENALS:  Unremarkable





KIDNEYS & URETERS:  Unremarkable





BLADDER:  Unremarkable





REPRODUCTIVE ORGANS:  Unremarkable





GASTROINTESTINAL: Extensive colonic diverticulosis. The stomach, small bowel, 

and colon are otherwise unremarkable. The appendix is normal.





MESENTERY/PERITONEUM/RETROPERITONEUM: Unremarkable





VASCULAR:  Unremarkable





LYMPH NODES:  No adenopathy





OSSEOUS & SOFT TISSUES:  Unremarkable





IMPRESSION:





Essentially unremarkable CT of the chest,  abdomen, and pelvis with no evidence 

of malignancy.





Electronically signed by: Megan Melvin MD (3/22/2021 10:08 AM) [id number]














DICTATED and SIGNED BY:     MEGAN MELVIN MD


DATE:     03/22/21 [id number]





Laboratory Tests








Test


 3/21/21


11:43 3/21/21


16:47 3/21/21


20:19 3/22/21


07:55


 


Glucose (Fingerstick)


 85 mg/dL


(70-99) 121 mg/dL


(70-99) 116 mg/dL


(70-99) 82 mg/dL


(70-99)











Assessment and Plan


Assessmemt and Plan


Problems


Medical Problems:


(1) Cellulitis


Status: Acute  





(2) Rash and nonspecific skin eruption


Status: Acute  











Comment


Review of Relevant


I have reviewed the following items cassie (where applicable) has been applied.


Labs





Laboratory Tests








Test


 3/20/21


16:19 3/20/21


20:55 3/21/21


07:39 3/21/21


11:43


 


Glucose (Fingerstick)


 165 mg/dL


(70-99) 88 mg/dL


(70-99) 77 mg/dL


(70-99) 85 mg/dL


(70-99)


 


Test


 3/21/21


16:47 3/21/21


20:19 3/22/21


07:55 





 


Glucose (Fingerstick)


 121 mg/dL


(70-99) 116 mg/dL


(70-99) 82 mg/dL


(70-99) 











Laboratory Tests








Test


 3/21/21


11:43 3/21/21


16:47 3/21/21


20:19 3/22/21


07:55


 


Glucose (Fingerstick)


 85 mg/dL


(70-99) 121 mg/dL


(70-99) 116 mg/dL


(70-99) 82 mg/dL


(70-99)








Medications





Current Medications


Vancomycin HCl 1.25 gm/Sodium Chloride 250 ml @  166.667 mls/hr 1X  ONCE IV  

Last administered on 3/19/21at 03:02;  Start 3/19/21 at 02:00;  Stop 3/19/21 at 

03:29;  Status DC


Montelukast Sodium (Singulair) 10 mg QHS PO  Last administered on 3/21/21at 

20:52;  Start 3/19/21 at 21:00


Montelukast Sodium (Singulair) 10 mg 1X  ONCE PO  Last administered on 3/19/21at

09:11;  Start 3/19/21 at 07:45;  Stop 3/19/21 at 07:46;  Status DC


Diphenhydramine HCl (Benadryl) 25 mg PRN Q6HRS  PRN PO ITCHING Last administered

on 3/21/21at 08:34;  Start 3/19/21 at 09:00


Methylprednisolone Sodium Succinate (SOLU-Medrol 40MG VIAL) 40 mg Q8HRS IV ;  

Start 3/19/21 at 14:00;  Stop 3/19/21 at 11:15;  Status DC


Famotidine (Pepcid) 20 mg BID PO  Last administered on 3/21/21at 20:51;  Start 

3/19/21 at 09:00


Prednisone (Prednisone) 40 mg DAILY PO  Last administered on 3/21/21at 08:36;  

Start 3/19/21 at 11:30


Acetaminophen (Tylenol) 650 mg PRN Q6HRS  PRN PO MILD PAIN / TEMP > 100.3'F Last

administered on 3/20/21at 17:01;  Start 3/19/21 at 21:00


Insulin Human Lispro (HumaLOG) 0-9 UNITS TIDWMEALS SQ  Last administered on 

3/20/21at 17:07;  Start 3/20/21 at 08:00


Dextrose (Dextrose 50%-Water Syringe) 12.5 gm PRN Q15MIN  PRN IV SEE COMMENTS;  

Start 3/19/21 at 22:00


Doxycycline Hyclate (Vibra-Tab) 100 mg BID PO  Last administered on 3/21/21at 

20:52;  Start 3/19/21 at 22:00


Cephalexin HCl (Keflex) 500 mg BID PO  Last administered on 3/21/21at 20:52;  

Start 3/20/21 at 09:00


Hydralazine HCl (Apresoline Inj) 10 mg PRN Q4HRS  PRN IVP ELEVATED BP, SEE 

COMMENTS;  Start 3/20/21 at 16:00;  Stop 3/20/21 at 17:57;  Status DC


Hydralazine HCl (Apresoline) 10 mg PRN TID  PRN PO FOR SBP > 160 Last 

administered on 3/21/21at 20:52;  Start 3/20/21 at 18:00


Amlodipine Besylate (Norvasc) 5 mg DAILY PO  Last administered on 3/21/21at 

15:40;  Start 3/21/21 at 14:30


Multi-Ingredient Ointment (Hydrocerin, Eucerin Cream) 1 jose g PRN Q1HR  PRN TP DRY

SKIN / SCALING Last administered on 3/21/21at 20:53;  Start 3/21/21 at 15:15


Triamcinolone Acetonide (Kenalog 0.1%) 1 jose g TID TP  Last administered on 

3/21/21at 20:53;  Start 3/21/21 at 21:00


Iohexol (Omnipaque 300 Mg/ml) 75 ml 1X  ONCE IV ;  Start 3/22/21 at 08:00;  Stop

3/22/21 at 08:01;  Status DC


Iohexol (Omnipaque 240 Mg/ml) 50 ml 1X  ONCE PO ;  Start 3/22/21 at 08:00;  Stop

3/22/21 at 08:01;  Status DC


Info (CONTRAST GIVEN -- Rx MONITORING) 1 each PRN DAILY  PRN MC SEE COMMENTS;  

Start 3/22/21 at 08:00;  Stop 3/24/21 at 07:59





Active Scripts


Active


Vitals/I & O





Vital Sign - Last 24 Hours








 3/21/21 3/21/21 3/21/21 3/21/21





 11:39 15:16 15:40 18:36


 


Temp 98.1 98.4  98.1





 98.1 98.4  98.1


 


Pulse 86 94 94 81


 


Resp 20 18  22


 


B/P (MAP) 180/88 (118) 145/84 (104) 145/84 182/84 (116)


 


Pulse Ox 99 98  98


 


O2 Delivery Room Air Room Air  Room Air


 


    





    





 3/21/21 3/21/21 3/21/21 3/22/21





 19:45 20:52 22:57 02:40


 


Temp   98.0 98.0





   98.0 98.0


 


Pulse  81 78 65


 


Resp   22 17


 


B/P (MAP)  182/84 170/81 (110) 144/72 (96)


 


Pulse Ox   99 96


 


O2 Delivery Room Air  Room Air Room Air














Intake and Output   


 


 3/21/21 3/21/21 3/22/21





 15:00 23:00 07:00


 


Intake Total 1020 ml 1250 ml 


 


Balance 1020 ml 1250 ml 











Justicifation of Admission Dx:


Justifications for Admission:


Justification of Admission Dx:  Yes


Cellulitis:  Cellulitis











LENIN VIDAL MD          Mar 22, 2021 08:06

## 2021-03-22 NOTE — NUR
SS following up with discharge planning. SS reviewed pt chart and discussed with pt RN and 
physician. Pt is currently on room air. Pt continues to have skin rash. ID consulted this 
morning. Pt is self pay. Discharge plan is to home when medically ready. SS will continue to 
follow for discharge planning.

## 2021-03-23 VITALS — DIASTOLIC BLOOD PRESSURE: 89 MMHG | SYSTOLIC BLOOD PRESSURE: 170 MMHG

## 2021-03-23 VITALS — DIASTOLIC BLOOD PRESSURE: 73 MMHG | SYSTOLIC BLOOD PRESSURE: 145 MMHG

## 2021-03-23 VITALS — DIASTOLIC BLOOD PRESSURE: 95 MMHG | SYSTOLIC BLOOD PRESSURE: 144 MMHG

## 2021-03-23 RX ADMIN — TRIAMCINOLONE ACETONIDE SCH APP: 1 CREAM TOPICAL at 08:45

## 2021-03-23 RX ADMIN — FAMOTIDINE SCH MG: 20 TABLET ORAL at 08:45

## 2021-03-23 RX ADMIN — CEPHALEXIN SCH MG: 250 CAPSULE ORAL at 08:45

## 2021-03-23 RX ADMIN — DOXYCYCLINE HYCLATE SCH MG: 100 TABLET, COATED ORAL at 08:45

## 2021-03-23 RX ADMIN — INSULIN LISPRO SCH UNITS: 100 INJECTION, SOLUTION INTRAVENOUS; SUBCUTANEOUS at 08:00

## 2021-03-23 NOTE — PDOC3
Discharge Summary


Date of Admission:  Mar 21, 2021


Date of Discharge:  Mar 23, 2021


Follow-Up:  3-5 days


Admitting Diagnosis comment:





Chief Complaint


Chief Complaint


impression ===============








Rash - allergic dermatitis likely from multiple bedbug bites. Going on for 

greater than 1 months, will give steroids IV, benadryl, singulair. Alcohol spray

to kill his bedbugs at home


Cellulitis - in bilateral arms and legs due to excoriations. IV vancomycin 

initiated. rx  PO doxycycline and keflex when improved


Bedbug infestation - will search clothing. Advised on bedbug eradication 

strategies. Could prescribe permethrin on d/c, however with his skin condition 

this could worsen his rash.


Asthma - prn nebs


Diabetes-Type II - sliding scale


Hypertension - will given prn hydralazine, has not been taking his home meds


hiv neg screen 











plan=======================





FEN- ADA diet


PPX - Ambulatory, low risk


FULL CODE


Dispo - inpatient for severe allergic reaction and severe cellulitis due to 

bedbug infestation


ID CONSULT


Dermatology consult as out patient


see pcp this week  asap





BELINDA PENDING








d/c planning 36 min





History of Present Illness


History of Present Illness


Mr Combs is a 25  year old male w/ PMHx Asthma, Diabetes-Type II, Hyper

tension presents with a chief complaint of rash.  Rash is diffuse in nature 

includes bilateral extremities torso and abdomen.  Patient states rash is been 

ongoing for 1 month progressively becoming worse.  Patient was seen in the 

emergency department 1 week ago placed on antibiotics. Rash is intensely 

pruritic, covering more than 60% of his body with crusting in multiple 

locations.


He notes he has "ticks" in his bed. Notably the pictures of the insects he 

describe are actually bedbugs. I have educated him on this.


Eosinophils 1.2x10^3, otherwise labs WNL.


Admitted for further care.





His rash has improved with steroids. Has improvement in his cellulitis, but does

have a pocket on his right lower back that has been draining. No fluctuance.





Vitals


Vitals





Vital Signs








  Date Time  Temp Pulse Resp B/P (MAP) Pulse Ox O2 Delivery O2 Flow Rate FiO2


 


3/23/21 08:45  80  144/95    


 


3/23/21 07:55 97.9  16  98 Room Air  





 97.9       











Physical Exam


Physical Exam


General:  Alert, Oriented X3


HEENT:  Atraumatic, PERRLA, EOMI, Mucous membr. moist/pink


Lungs:  Clear to auscultation


Heart:  Normal S1, Normal S2


Abdomen:  Normal bowel sounds, Soft, No tenderness


Extremities:  No edema, Other


Skin:  Other (Diffuse maculopapular rash with dry scaly skin mainly over both 

upper extremity, neck, upper chest and abdomen)


Neuro:  Normal speech, Strength at 5/5 X4 ext, Normal tone, Cranial nerves 3-12 

NL


Psych/Mental Status:  Mental status NL, Mood NL


MUSCULOSKELETAL:  Full range of motion without pain


General:  Alert, Oriented X3, No acute distress


Heart:  Normal S1, Normal S2


Lungs:  Clear


Abdomen:  Normal bowel sounds, Soft, No tenderness


Extremities:  No cyanosis, No edema, Other


Skin:  Other (Diffuse maculopapular rash with dry scaly skin mainly over both 

upper extremity, neck, upper chest and abdomen)





Labs


LABS





Laboratory Tests








Test


 3/22/21


12:03 3/22/21


17:03 3/22/21


20:41 3/23/21


08:17


 


Glucose (Fingerstick)


 98 mg/dL


(70-99) 128 mg/dL


(70-99) 122 mg/dL


(70-99) 71 mg/dL


(70-99)








FINAL DIAGNOSIS


Problems


Medical Problems:


(1) Cellulitis


Status: Acute  





(2) Rash and nonspecific skin eruption


Status: Acute  








Brief Hospital Course


Mr. Jay  is a 25 old [sex] who presented with [ cellulitis, rash]


CONDITION AT DISCHARGE:  Improved


Discharge Medications





Current Medications


Vancomycin HCl 1.25 gm/Sodium Chloride 250 ml @  166.667 mls/hr 1X  ONCE IV  

Last administered on 3/19/21at 03:02;  Start 3/19/21 at 02:00;  Stop 3/19/21 at 

03:29;  Status DC


Montelukast Sodium (Singulair) 10 mg QHS PO  Last administered on 3/22/21at 

20:53;  Start 3/19/21 at 21:00


Montelukast Sodium (Singulair) 10 mg 1X  ONCE PO  Last administered on 3/19/21at

09:11;  Start 3/19/21 at 07:45;  Stop 3/19/21 at 07:46;  Status DC


Diphenhydramine HCl (Benadryl) 25 mg PRN Q6HRS  PRN PO ITCHING Last administered

on 3/21/21at 08:34;  Start 3/19/21 at 09:00


Methylprednisolone Sodium Succinate (SOLU-Medrol 40MG VIAL) 40 mg Q8HRS IV ;  

Start 3/19/21 at 14:00;  Stop 3/19/21 at 11:15;  Status DC


Famotidine (Pepcid) 20 mg BID PO  Last administered on 3/23/21at 08:45;  Start 

3/19/21 at 09:00


Prednisone (Prednisone) 40 mg DAILY PO  Last administered on 3/23/21at 08:45;  

Start 3/19/21 at 11:30


Acetaminophen (Tylenol) 650 mg PRN Q6HRS  PRN PO MILD PAIN / TEMP > 100.3'F Last

administered on 3/20/21at 17:01;  Start 3/19/21 at 21:00


Insulin Human Lispro (HumaLOG) 0-9 UNITS TIDWMEALS SQ  Last administered on 

3/20/21at 17:07;  Start 3/20/21 at 08:00


Dextrose (Dextrose 50%-Water Syringe) 12.5 gm PRN Q15MIN  PRN IV SEE COMMENTS;  

Start 3/19/21 at 22:00


Doxycycline Hyclate (Vibra-Tab) 100 mg BID PO  Last administered on 3/23/21at 

08:45;  Start 3/19/21 at 22:00


Cephalexin HCl (Keflex) 500 mg BID PO  Last administered on 3/23/21at 08:45;  

Start 3/20/21 at 09:00


Hydralazine HCl (Apresoline Inj) 10 mg PRN Q4HRS  PRN IVP ELEVATED BP, SEE 

COMMENTS;  Start 3/20/21 at 16:00;  Stop 3/20/21 at 17:57;  Status DC


Hydralazine HCl (Apresoline) 10 mg PRN TID  PRN PO FOR SBP > 160 Last 

administered on 3/21/21at 20:52;  Start 3/20/21 at 18:00


Amlodipine Besylate (Norvasc) 5 mg DAILY PO  Last administered on 3/23/21at 

08:45;  Start 3/21/21 at 14:30


Multi-Ingredient Ointment (Hydrocerin, Eucerin Cream) 1 jose g PRN Q1HR  PRN TP DRY

SKIN / SCALING Last administered on 3/21/21at 20:53;  Start 3/21/21 at 15:15


Triamcinolone Acetonide (Kenalog 0.1%) 1 jose g TID TP  Last administered on 

3/23/21at 08:45;  Start 3/21/21 at 21:00


Iohexol (Omnipaque 300 Mg/ml) 75 ml 1X  ONCE IV  Last administered on 3/22/21at 

08:00;  Start 3/22/21 at 08:00;  Stop 3/22/21 at 08:01;  Status DC


Iohexol (Omnipaque 240 Mg/ml) 50 ml 1X  ONCE PO  Last administered on 3/22/21at 

08:26;  Start 3/22/21 at 08:00;  Stop 3/22/21 at 08:01;  Status DC


Info (CONTRAST GIVEN -- Rx MONITORING) 1 each PRN DAILY  PRN MC SEE COMMENTS;  

Start 3/22/21 at 08:00;  Stop 3/24/21 at 07:59





Active Scripts


Active


Vital Signs





Vital Signs








  Date Time  Temp Pulse Resp B/P (MAP) Pulse Ox O2 Delivery O2 Flow Rate FiO2


 


3/23/21 08:45  80  144/95    


 


3/23/21 08:00      Room Air  


 


3/23/21 07:55 97.9  16  98   





 97.9       








Labs





Laboratory Tests








Test


 3/21/21


11:43 3/21/21


16:47 3/21/21


20:19 3/22/21


07:40


 


Glucose (Fingerstick)


 85 mg/dL


(70-99) 121 mg/dL


(70-99) 116 mg/dL


(70-99) 





 


White Blood Count


 


 


 


 5.8 x10^3/uL


(4.0-11.0)


 


Red Blood Count


 


 


 


 4.63 x10^6/uL


(4.30-5.70)


 


Hemoglobin


 


 


 


 13.1 g/dL


(13.0-17.5)


 


Hematocrit


 


 


 


 39.8 %


(39.0-53.0)


 


Mean Corpuscular Volume    86 fL () 


 


Mean Corpuscular Hemoglobin    28 pg (25-35) 


 


Mean Corpuscular Hemoglobin


Concent 


 


 


 33 g/dL


(31-37)


 


Red Cell Distribution Width


 


 


 


 14.2 %


(11.5-14.5)


 


Platelet Count


 


 


 


 232 x10^3/uL


(140-400)


 


Neutrophils (%) (Auto)    56 % (31-73) 


 


Lymphocytes (%) (Auto)    32 % (24-48) 


 


Monocytes (%) (Auto)    9 % (0-9) 


 


Eosinophils (%) (Auto)    2 % (0-3) 


 


Basophils (%) (Auto)    1 % (0-3) 


 


Neutrophils # (Auto)


 


 


 


 3.2 x10^3/uL


(1.8-7.7)


 


Lymphocytes # (Auto)


 


 


 


 1.8 x10^3/uL


(1.0-4.8)


 


Monocytes # (Auto)


 


 


 


 0.5 x10^3/uL


(0.0-1.1)


 


Eosinophils # (Auto)


 


 


 


 0.1 x10^3/uL


(0.0-0.7)


 


Basophils # (Auto)


 


 


 


 0.1 x10^3/uL


(0.0-0.2)


 


Erythrocyte Sedimentation Rate    10 (0-15) 


 


Sodium Level


 


 


 


 144 mmol/L


(136-145)


 


Potassium Level


 


 


 


 3.8 mmol/L


(3.5-5.1)


 


Chloride Level


 


 


 


 105 mmol/L


()


 


Carbon Dioxide Level


 


 


 


 32 mmol/L


(21-32)


 


Anion Gap    7 (6-14) 


 


Blood Urea Nitrogen    9 mg/dL (8-26) 


 


Creatinine


 


 


 


 0.9 mg/dL


(0.7-1.3)


 


Estimated GFR


(Cockcroft-Gault) 


 


 


 101.2 





 


BUN/Creatinine Ratio    10 (6-20) 


 


Glucose Level


 


 


 


 80 mg/dL


(70-99)


 


Calcium Level


 


 


 


 8.6 mg/dL


(8.5-10.1)


 


Total Bilirubin


 


 


 


 1.1 mg/dL


(0.2-1.0)


 


Aspartate Amino Transf


(AST/SGOT) 


 


 


 33 U/L (15-37) 





 


Alanine Aminotransferase


(ALT/SGPT) 


 


 


 64 U/L (16-63) 





 


Alkaline Phosphatase


 


 


 


 59 U/L


()


 


C-Reactive Protein,


Quantitative 


 


 


 1.5 mg/L


(0-3.3)


 


Total Protein


 


 


 


 6.8 g/dL


(6.4-8.2)


 


Albumin


 


 


 


 3.2 g/dL


(3.4-5.0)


 


Albumin/Globulin Ratio    0.9 (1.0-1.7) 


 


Vitamin B12 Level


 


 


 


 575 pg/mL


(247-911)


 


Thyroid Stimulating Hormone


(TSH) 


 


 


 3.210 uIU/mL


(0.358-3.74)


 


Rheumatoid Factor


 


 


 


 36.6 IU/mL


(0.0-13.9)


 


Treponema pallidum Antibody


 


 


 


 Nonreactive


(Nonreactive)


 


HIV (1&2) Antibody Screen


 


 


 


 Nonreactive


(Nonreactive)


 


Test


 3/22/21


07:55 3/22/21


12:03 3/22/21


17:03 3/22/21


20:41


 


Glucose (Fingerstick)


 82 mg/dL


(70-99) 98 mg/dL


(70-99) 128 mg/dL


(70-99) 122 mg/dL


(70-99)


 


Test


 3/23/21


08:17 


 


 





 


Glucose (Fingerstick)


 71 mg/dL


(70-99) 


 


 











Laboratory Tests








Test


 3/22/21


12:03 3/22/21


17:03 3/22/21


20:41 3/23/21


08:17


 


Glucose (Fingerstick)


 98 mg/dL


(70-99) 128 mg/dL


(70-99) 122 mg/dL


(70-99) 71 mg/dL


(70-99)








Allergies





                                    Allergies








Coded Allergies Type Severity Reaction Last Updated Verified


 


  Penicillins Allergy Intermediate  3/20/21 Yes








Disposition/Orders:  D/C to Home





Justicifation of Admission Dx:


Justifications for Admission:


Justification of Admission Dx:  Yes


Cellulitis:  Cellulitis











LENIN VIDAL MD          Mar 23, 2021 09:48

## 2021-03-23 NOTE — PDOC
PROGRESS NOTES


Date of Service:


DATE: 3/23/21 


TIME: 08:55





Chief Complaint


Chief Complaint


impression ===============








Rash - allergic dermatitis likely from multiple bedbug bites. Going on for 

greater than 1 months, will give steroids IV, benadryl, singulair. Alcohol spray

to kill his bedbugs at home


Cellulitis - in bilateral arms and legs due to excoriations. IV vancomycin 

initiated. rx  PO doxycycline and keflex when improved


Bedbug infestation - will search clothing. Advised on bedbug eradication 

strategies. Could prescribe permethrin on d/c, however with his skin condition 

this could worsen his rash.


Asthma - prn nebs


Diabetes-Type II - sliding scale


Hypertension - will given prn hydralazine, has not been taking his home meds


hiv neg screen 











plan=======================





FEN- ADA diet


PPX - Ambulatory, low risk


FULL CODE


Dispo - inpatient for severe allergic reaction and severe cellulitis due to 

bedbug infestation


ID CONSULT


Dermatology consult as out patient





BELINDA PENDING





History of Present Illness


History of Present Illness


Mr Combs is a 25  year old male w/ PMHx Asthma, Diabetes-Type II, 

Hypertension presents with a chief complaint of rash.  Rash is diffuse in nature

includes bilateral extremities torso and abdomen.  Patient states rash is been 

ongoing for 1 month progressively becoming worse.  Patient was seen in the 

emergency department 1 week ago placed on antibiotics. Rash is intensely 

pruritic, covering more than 60% of his body with crusting in multiple 

locations.


He notes he has "ticks" in his bed. Notably the pictures of the insects he 

describe are actually bedbugs. I have educated him on this.


Eosinophils 1.2x10^3, otherwise labs WNL.


Admitted for further care.





His rash has improved with steroids. Has improvement in his cellulitis, but does

have a pocket on his right lower back that has been draining. No fluctuance.





Vitals


Vitals





Vital Signs








  Date Time  Temp Pulse Resp B/P (MAP) Pulse Ox O2 Delivery O2 Flow Rate FiO2


 


3/23/21 08:45  80  144/95    


 


3/23/21 07:55 97.9  16  98 Room Air  





 97.9       











Physical Exam


Physical Exam


General:  Alert, Oriented X3


HEENT:  Atraumatic, PERRLA, EOMI, Mucous membr. moist/pink


Lungs:  Clear to auscultation


Heart:  Normal S1, Normal S2


Abdomen:  Normal bowel sounds, Soft, No tenderness


Extremities:  No edema, Other


Skin:  Other (Diffuse maculopapular rash with dry scaly skin mainly over both 

upper extremity, neck, upper chest and abdomen)


Neuro:  Normal speech, Strength at 5/5 X4 ext, Normal tone, Cranial nerves 3-12 

NL


Psych/Mental Status:  Mental status NL, Mood NL


MUSCULOSKELETAL:  Full range of motion without pain


General:  Alert, Oriented X3, No acute distress


Heart:  Normal S1, Normal S2


Lungs:  Clear


Abdomen:  Normal bowel sounds, Soft, No tenderness


Extremities:  No cyanosis, No edema, Other


Skin:  Other (Diffuse maculopapular rash with dry scaly skin mainly over both 

upper extremity, neck, upper chest and abdomen)





Labs


LABS





Laboratory Tests








Test


 3/22/21


12:03 3/22/21


17:03 3/22/21


20:41 3/23/21


08:17


 


Glucose (Fingerstick)


 98 mg/dL


(70-99) 128 mg/dL


(70-99) 122 mg/dL


(70-99) 71 mg/dL


(70-99)











Assessment and Plan


Assessmemt and Plan


Problems


Medical Problems:


(1) Cellulitis


Status: Acute  





(2) Rash and nonspecific skin eruption


Status: Acute  











Comment


Review of Relevant


I have reviewed the following items cassie (where applicable) has been applied.


Labs





Laboratory Tests








Test


 3/21/21


11:43 3/21/21


16:47 3/21/21


20:19 3/22/21


07:40


 


Glucose (Fingerstick)


 85 mg/dL


(70-99) 121 mg/dL


(70-99) 116 mg/dL


(70-99) 





 


White Blood Count


 


 


 


 5.8 x10^3/uL


(4.0-11.0)


 


Red Blood Count


 


 


 


 4.63 x10^6/uL


(4.30-5.70)


 


Hemoglobin


 


 


 


 13.1 g/dL


(13.0-17.5)


 


Hematocrit


 


 


 


 39.8 %


(39.0-53.0)


 


Mean Corpuscular Volume    86 fL () 


 


Mean Corpuscular Hemoglobin    28 pg (25-35) 


 


Mean Corpuscular Hemoglobin


Concent 


 


 


 33 g/dL


(31-37)


 


Red Cell Distribution Width


 


 


 


 14.2 %


(11.5-14.5)


 


Platelet Count


 


 


 


 232 x10^3/uL


(140-400)


 


Neutrophils (%) (Auto)    56 % (31-73) 


 


Lymphocytes (%) (Auto)    32 % (24-48) 


 


Monocytes (%) (Auto)    9 % (0-9) 


 


Eosinophils (%) (Auto)    2 % (0-3) 


 


Basophils (%) (Auto)    1 % (0-3) 


 


Neutrophils # (Auto)


 


 


 


 3.2 x10^3/uL


(1.8-7.7)


 


Lymphocytes # (Auto)


 


 


 


 1.8 x10^3/uL


(1.0-4.8)


 


Monocytes # (Auto)


 


 


 


 0.5 x10^3/uL


(0.0-1.1)


 


Eosinophils # (Auto)


 


 


 


 0.1 x10^3/uL


(0.0-0.7)


 


Basophils # (Auto)


 


 


 


 0.1 x10^3/uL


(0.0-0.2)


 


Erythrocyte Sedimentation Rate    10 (0-15) 


 


Sodium Level


 


 


 


 144 mmol/L


(136-145)


 


Potassium Level


 


 


 


 3.8 mmol/L


(3.5-5.1)


 


Chloride Level


 


 


 


 105 mmol/L


()


 


Carbon Dioxide Level


 


 


 


 32 mmol/L


(21-32)


 


Anion Gap    7 (6-14) 


 


Blood Urea Nitrogen    9 mg/dL (8-26) 


 


Creatinine


 


 


 


 0.9 mg/dL


(0.7-1.3)


 


Estimated GFR


(Cockcroft-Gault) 


 


 


 101.2 





 


BUN/Creatinine Ratio    10 (6-20) 


 


Glucose Level


 


 


 


 80 mg/dL


(70-99)


 


Calcium Level


 


 


 


 8.6 mg/dL


(8.5-10.1)


 


Total Bilirubin


 


 


 


 1.1 mg/dL


(0.2-1.0)


 


Aspartate Amino Transf


(AST/SGOT) 


 


 


 33 U/L (15-37) 





 


Alanine Aminotransferase


(ALT/SGPT) 


 


 


 64 U/L (16-63) 





 


Alkaline Phosphatase


 


 


 


 59 U/L


()


 


C-Reactive Protein,


Quantitative 


 


 


 1.5 mg/L


(0-3.3)


 


Total Protein


 


 


 


 6.8 g/dL


(6.4-8.2)


 


Albumin


 


 


 


 3.2 g/dL


(3.4-5.0)


 


Albumin/Globulin Ratio    0.9 (1.0-1.7) 


 


Vitamin B12 Level


 


 


 


 575 pg/mL


(247-911)


 


Thyroid Stimulating Hormone


(TSH) 


 


 


 3.210 uIU/mL


(0.358-3.74)


 


Rheumatoid Factor


 


 


 


 36.6 IU/mL


(0.0-13.9)


 


Treponema pallidum Antibody


 


 


 


 Nonreactive


(Nonreactive)


 


HIV (1&2) Antibody Screen


 


 


 


 Nonreactive


(Nonreactive)


 


Test


 3/22/21


07:55 3/22/21


12:03 3/22/21


17:03 3/22/21


20:41


 


Glucose (Fingerstick)


 82 mg/dL


(70-99) 98 mg/dL


(70-99) 128 mg/dL


(70-99) 122 mg/dL


(70-99)


 


Test


 3/23/21


08:17 


 


 





 


Glucose (Fingerstick)


 71 mg/dL


(70-99) 


 


 











Laboratory Tests








Test


 3/22/21


12:03 3/22/21


17:03 3/22/21


20:41 3/23/21


08:17


 


Glucose (Fingerstick)


 98 mg/dL


(70-99) 128 mg/dL


(70-99) 122 mg/dL


(70-99) 71 mg/dL


(70-99)








Medications





Current Medications


Vancomycin HCl 1.25 gm/Sodium Chloride 250 ml @  166.667 mls/hr 1X  ONCE IV  

Last administered on 3/19/21at 03:02;  Start 3/19/21 at 02:00;  Stop 3/19/21 at 

03:29;  Status DC


Montelukast Sodium (Singulair) 10 mg QHS PO  Last administered on 3/22/21at 

20:53;  Start 3/19/21 at 21:00


Montelukast Sodium (Singulair) 10 mg 1X  ONCE PO  Last administered on 3/19/21at

09:11;  Start 3/19/21 at 07:45;  Stop 3/19/21 at 07:46;  Status DC


Diphenhydramine HCl (Benadryl) 25 mg PRN Q6HRS  PRN PO ITCHING Last administered

on 3/21/21at 08:34;  Start 3/19/21 at 09:00


Methylprednisolone Sodium Succinate (SOLU-Medrol 40MG VIAL) 40 mg Q8HRS IV ;  

Start 3/19/21 at 14:00;  Stop 3/19/21 at 11:15;  Status DC


Famotidine (Pepcid) 20 mg BID PO  Last administered on 3/23/21at 08:45;  Start 

3/19/21 at 09:00


Prednisone (Prednisone) 40 mg DAILY PO  Last administered on 3/23/21at 08:45;  

Start 3/19/21 at 11:30


Acetaminophen (Tylenol) 650 mg PRN Q6HRS  PRN PO MILD PAIN / TEMP > 100.3'F Last

administered on 3/20/21at 17:01;  Start 3/19/21 at 21:00


Insulin Human Lispro (HumaLOG) 0-9 UNITS TIDWMEALS SQ  Last administered on 

3/20/21at 17:07;  Start 3/20/21 at 08:00


Dextrose (Dextrose 50%-Water Syringe) 12.5 gm PRN Q15MIN  PRN IV SEE COMMENTS;  

Start 3/19/21 at 22:00


Doxycycline Hyclate (Vibra-Tab) 100 mg BID PO  Last administered on 3/23/21at 

08:45;  Start 3/19/21 at 22:00


Cephalexin HCl (Keflex) 500 mg BID PO  Last administered on 3/23/21at 08:45;  

Start 3/20/21 at 09:00


Hydralazine HCl (Apresoline Inj) 10 mg PRN Q4HRS  PRN IVP ELEVATED BP, SEE 

COMMENTS;  Start 3/20/21 at 16:00;  Stop 3/20/21 at 17:57;  Status DC


Hydralazine HCl (Apresoline) 10 mg PRN TID  PRN PO FOR SBP > 160 Last 

administered on 3/21/21at 20:52;  Start 3/20/21 at 18:00


Amlodipine Besylate (Norvasc) 5 mg DAILY PO  Last administered on 3/23/21at 

08:45;  Start 3/21/21 at 14:30


Multi-Ingredient Ointment (Hydrocerin, Eucerin Cream) 1 jose g PRN Q1HR  PRN TP DRY

SKIN / SCALING Last administered on 3/21/21at 20:53;  Start 3/21/21 at 15:15


Triamcinolone Acetonide (Kenalog 0.1%) 1 jose g TID TP  Last administered on 

3/23/21at 08:45;  Start 3/21/21 at 21:00


Iohexol (Omnipaque 300 Mg/ml) 75 ml 1X  ONCE IV  Last administered on 3/22/21at 

08:00;  Start 3/22/21 at 08:00;  Stop 3/22/21 at 08:01;  Status DC


Iohexol (Omnipaque 240 Mg/ml) 50 ml 1X  ONCE PO  Last administered on 3/22/21at 

08:26;  Start 3/22/21 at 08:00;  Stop 3/22/21 at 08:01;  Status DC


Info (CONTRAST GIVEN -- Rx MONITORING) 1 each PRN DAILY  PRN MC SEE COMMENTS;  

Start 3/22/21 at 08:00;  Stop 3/24/21 at 07:59





Active Scripts


Active


Vitals/I & O





Vital Sign - Last 24 Hours








 3/22/21 3/22/21 3/22/21 3/22/21





 09:05 10:58 14:15 19:36


 


Temp  97.9 98.3 98.3





  97.9 98.3 98.3


 


Pulse 73 81 92 83


 


Resp  16 16 20


 


B/P (MAP) 138/63 135/76 (95) 159/81 (107) 168/69 (102)


 


Pulse Ox  98 98 94


 


O2 Delivery  Room Air Room Air Room Air


 


    





    





 3/22/21 3/22/21 3/23/21 3/23/21





 20:00 22:49 03:39 07:55


 


Temp  98.1 98.0 97.9





  98.1 98.0 97.9


 


Pulse  80 85 80


 


Resp  18 16 16


 


B/P (MAP)  158/82 (107) 145/73 (97) 144/95 (111)


 


Pulse Ox  98 97 98


 


O2 Delivery Room Air Room Air Room Air Room Air


 


    





    





 3/23/21   





 08:45   


 


Pulse 80   


 


B/P (MAP) 144/95   














Intake and Output   


 


 3/22/21 3/22/21 3/23/21





 15:00 23:00 07:00


 


Intake Total  300 ml 0 ml


 


Balance  300 ml 0 ml











Justicifation of Admission Dx:


Justifications for Admission:


Justification of Admission Dx:  Yes


Cellulitis:  Cellulitis











LENIN VIDAL MD          Mar 23, 2021 08:55

## 2021-03-23 NOTE — DISCH
DISCHARGE INSTRUCTIONS


Condition on Discharge


Condition on Discharge:  Stable





Activity After Discharge


Activity Instructions for Disc:  Activity as tolerated


Driving Instructions after Dis:  Do not drive today





Diet after Discharge


Diet after Discharge:  Cardiac


Liquid Texture:  Thin Liquid





Checks after Discharge


Checks after discharge:  Check blood press - daily





Contacting the [facility] after DC


Call your doctor for:  If your condition worsens





Follow-Up


Follow up with:  [facility] this week,  dermatology 1 week





Treatment/Equipment after DC


Adaptive Equipment Issued:  None











LENIN VIDAL MD          Mar 23, 2021 09:53

## 2021-03-23 NOTE — NUR
SS following up with discharge planning. SS reviewed pt chart and discussed with pt RN. Pt 
is currently on room air. Pt on PO antibiotics. Discharge order on the chart for home with 
self care.

## 2021-03-23 NOTE — PDOC
Infectious Disease Note


Subjective:


Subjective


Patient feels much better today


Rash Is improving


Denies fever, nausea, vomiting, shortness of breath, diarrhea, abdominal pain, 


Otherwise as above





Vital Signs:


Vital Signs





Vital Signs








  Date Time  Temp Pulse Resp B/P (MAP) Pulse Ox O2 Delivery O2 Flow Rate FiO2


 


3/23/21 03:39 98.0 85 16 145/73 (97) 97 Room Air  





 98.0       











Physical Exam:


PHYSICAL EXAM


General:  Alert, Oriented X3


HEENT:  Atraumatic, PERRLA, EOMI, Mucous membr. moist/pink


Lungs:  Clear to auscultation


Heart:  Normal S1, Normal S2


Abdomen:  Normal bowel sounds, Soft, No tenderness


Extremities:  No edema, Other


Skin:  Other (Diffuse maculopapular rash with dry scaly skin mainly over both 

upper extremity, neck, upper chest and abdomen)


Bilateral lower extremity multiple excoriations with hyperpigmentation present, 

no gross purulence,


Neuro:  Normal speech, Strength at 5/5 X4 ext, Normal tone, Cranial nerves 3-12 

NL


Psych/Mental Status:  Mental status NL, Mood NL


MUSCULOSKELETAL:  Full range of motion without pain





Medications:


Inpatient Meds:


Medications reviewed.





Labs:


Lab





Laboratory Tests








Test


 3/22/21


12:03 3/22/21


17:03 3/22/21


20:41


 


Glucose (Fingerstick)


 98 mg/dL


(70-99) 128 mg/dL


(70-99) 122 mg/dL


(70-99)











Objective:


Assessment:


Dermatitis improving etiology likely noninfectious.


Eosinophilia


Asthma


Diabetes mellitus diet controlled


Tobaccoism


Abnormal LFTs


Morbid obesity


Penicillin allergy with hives, tolerating cephalosporins well





Plan:


Plan of Care


 





Continue  keflex and doxycycline for now


Continue local care


 CT abdomen and pelvis noted


Monitor labs and cultures


Patient will need dermatology evaluation upon discharge as outpatient


Optimal diabetes control


Steroids per primary











CONSTANTINO CAN MD           Mar 23, 2021 08:03

## 2021-03-23 NOTE — NUR
Discharge Note:



DENA RENE



Discharge instructions and discharge home medications reviewed with Patient and a copy 
given. All questions have been answered and understanding verbalized. 



The following instructions and handouts were given: Rash



Patient discharged to home with self care via private vehicle.